# Patient Record
Sex: FEMALE | Race: WHITE | Employment: UNEMPLOYED | URBAN - METROPOLITAN AREA
[De-identification: names, ages, dates, MRNs, and addresses within clinical notes are randomized per-mention and may not be internally consistent; named-entity substitution may affect disease eponyms.]

---

## 2018-03-28 ENCOUNTER — TRANSFERRED RECORDS (OUTPATIENT)
Dept: HEALTH INFORMATION MANAGEMENT | Facility: CLINIC | Age: 23
End: 2018-03-28

## 2018-03-28 LAB
C TRACH DNA SPEC QL PROBE+SIG AMP: NEGATIVE
N GONORRHOEA DNA SPEC QL PROBE+SIG AMP: NEGATIVE
PAP-ABSTRACT: NORMAL
SPECIMEN DESCRIP: NORMAL
SPECIMEN DESCRIPTION: NORMAL

## 2018-04-04 ENCOUNTER — TRANSFERRED RECORDS (OUTPATIENT)
Dept: HEALTH INFORMATION MANAGEMENT | Facility: CLINIC | Age: 23
End: 2018-04-04

## 2018-04-04 LAB — PAP SMEAR - HIM PATIENT REPORTED: NORMAL

## 2018-04-20 ENCOUNTER — TRANSFERRED RECORDS (OUTPATIENT)
Dept: HEALTH INFORMATION MANAGEMENT | Facility: CLINIC | Age: 23
End: 2018-04-20

## 2018-05-22 ENCOUNTER — TRANSFERRED RECORDS (OUTPATIENT)
Dept: HEALTH INFORMATION MANAGEMENT | Facility: CLINIC | Age: 23
End: 2018-05-22

## 2018-06-19 ENCOUNTER — TRANSFERRED RECORDS (OUTPATIENT)
Dept: HEALTH INFORMATION MANAGEMENT | Facility: CLINIC | Age: 23
End: 2018-06-19

## 2018-10-11 ENCOUNTER — OFFICE VISIT (OUTPATIENT)
Dept: FAMILY MEDICINE | Facility: CLINIC | Age: 23
End: 2018-10-11
Payer: OTHER GOVERNMENT

## 2018-10-11 VITALS
SYSTOLIC BLOOD PRESSURE: 126 MMHG | WEIGHT: 168 LBS | HEIGHT: 69 IN | HEART RATE: 73 BPM | BODY MASS INDEX: 24.88 KG/M2 | TEMPERATURE: 98.3 F | DIASTOLIC BLOOD PRESSURE: 86 MMHG

## 2018-10-11 DIAGNOSIS — H53.10 EYE STRAIN, BILATERAL: ICD-10-CM

## 2018-10-11 DIAGNOSIS — R87.613 HSIL (HIGH GRADE SQUAMOUS INTRAEPITHELIAL LESION) ON PAP SMEAR OF CERVIX: Primary | ICD-10-CM

## 2018-10-11 DIAGNOSIS — Z98.890 S/P LEEP: ICD-10-CM

## 2018-10-11 PROCEDURE — 99203 OFFICE O/P NEW LOW 30 MIN: CPT | Performed by: FAMILY MEDICINE

## 2018-10-11 NOTE — PROGRESS NOTES
"  SUBJECTIVE:   Kindra Gresham is a 23 year old female who presents to clinic today for the following health issues:      [place] this [place].   Hx of HSIL on colposcopy, s/p LEEP procedure. Records sent to abstract.   Would like to establish care with Gyn for follow up testing.   No concerns currently.     No other significant health history.     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   Resolve on their own most times, takes ibuprofen occasionally.     Has not had eyes checked every. No trouble with blurry or double vision.   Sleeping well. No significant stressors.  Eating well, drinking water.   Espresso in the AM, nothing following that.   No sinus pressure, ear fullness, nasal congestion.   No dyspnea, palpitation, change in B/B.   No FH of headache syndromes or neurologic conditions.     No medications or supplements.     Health maintenance- robyn given    Problem list and histories reviewed & adjusted, as indicated.  Additional history: none    Patient Active Problem List   Diagnosis     HSIL (high grade squamous intraepithelial lesion) on Pap smear of cervix     S/P LEEP     History reviewed. No pertinent surgical history.    Social History   Substance Use Topics     Smoking status: Never Smoker     Smokeless tobacco: Never Used     Alcohol use Yes      Comment: occ     Family History   Problem Relation Age of Onset     Pulmonary Embolism Father      Myocardial Infarction Maternal Grandfather            Reviewed and updated as needed this visit by clinical staff       Reviewed and updated as needed this visit by Provider         ROS:  Constitutional, HEENT, cardiovascular, pulmonary, gi and gu systems are negative, except as otherwise noted.    OBJECTIVE:     /86 (BP Location: Right arm, Patient Position: Sitting, Cuff Size: Adult Regular)  Pulse 73  Temp 98.3  F (36.8  C) (Oral)  Ht 5' 9\" (1.753 m)  Wt 168 lb (76.2 " kg)  Breastfeeding? No  BMI 24.81 kg/m2  Body mass index is 24.81 kg/(m^2).  GENERAL: healthy, alert and no distress  EYES: Eyes grossly normal to inspection, EOMI, PERRL and conjunctivae and sclerae normal  HENT: ear canals and TM's normal, nose and mouth without ulcers or lesions  NECK: no adenopathy, no asymmetry, masses, or scars and thyroid normal to palpation  RESP: lungs clear to auscultation - no rales, rhonchi or wheezes  CV: regular rate and rhythm, normal S1 S2, no S3 or S4, no murmur, click or rub, no peripheral edema and peripheral pulses strong  MS: no gross musculoskeletal defects noted, no edema  SKIN: no suspicious lesions or rashes  NEURO: Normal strength and tone, mentation intact and speech normal  PSYCH: mentation appears normal, affect normal/bright    Diagnostic Test Results:  none     ASSESSMENT/PLAN:     1. HSIL (high grade squamous intraepithelial lesion) on Pap smear of cervix  - OB/GYN REFERRAL    2. S/P LEEP  - OB/GYN REFERRAL    3. Eye strain, bilateral - discussed likely cause of her headaches with studying, advised frequent breaks from her studies and eye exam in near future.     Drea Chau MD  [facility]

## 2018-10-11 NOTE — Clinical Note
Please abstract the following data from this visit with this patient into the appropriate field in Epic:  Pap smear done on this date:  (approximately), by this group: [facility], results were 4/4/18.

## 2018-10-11 NOTE — MR AVS SNAPSHOT
After Visit Summary   10/11/2018    Kindra Gresham    MRN: [id number]           Patient Information     Date Of Birth          1995        Visit Information        Provider Department      10/11/2018 2:00 PM Drea Chau MD [facility]        Today's Diagnoses     HSIL (high grade squamous intraepithelial lesion) on Pap smear of cervix        S/P LEEP           Follow-ups after your visit        Additional Services     OB/GYN REFERRAL       Your provider has referred you to:  FMG: [facility] [phone]   [email or web address]    Please be aware that coverage of these services is subject to the terms and limitations of your health insurance plan.  Call member services at your health plan with any benefit or coverage questions.      Please bring the following with you to your appointment:    (1) Any X-Rays, CTs or MRIs which have been performed.  Contact the facility where they were done to arrange for  prior to your scheduled appointment.   (2) List of current medications   (3) This referral request   (4) Any documents/labs given to you for this referral                  Who to contact     If you have questions or need follow up information about today's clinic visit or your schedule please contact [facility] directly at [phone].  Normal or non-critical lab and imaging results will be communicated to you by MyChart, letter or phone within 4 business days after the clinic has received the results. If you do not hear from us within 7 days, please contact the clinic through MyChart or phone. If you have a critical or abnormal lab result, we will notify you by phone as soon as possible.  Submit refill requests through [employer] or call your pharmacy and they will forward the refill request to us. Please allow 3 business days for your refill to be completed.          Additional Information  "About Your Visit        Market[employer]voice[employer] Information     Collective lets you send messages to your doctor, view your test results, renew your prescriptions, schedule appointments and more. To sign up, go to [email or web address] . Click on \"Log in\" on the left side of the screen, which will take you to the Welcome page. Then click on \"Sign up Now\" on the right side of the page.     You will be asked to enter the access code listed below, as well as some personal information. Please follow the directions to create your username and password.     Your access code is: [email or web address]  Expires: 2019  1:32 PM     Your access code will  in 90 days. If you need help or a new code, please call your [facility] clinic or [phone].        Care EveryWhere ID     This is your Care EveryWhere ID. This could be used by other organizations to access your [facility] medical records  [id number]        Your Vitals Were     Pulse Temperature Height Breastfeeding? BMI (Body Mass Index)       73 98.3  F (36.8  C) (Oral) 5' 9\" (1.753 m) No 24.81 kg/m2        Blood Pressure from Last 3 Encounters:   10/11/18 126/86    Weight from Last 3 Encounters:   10/11/18 168 lb (76.2 kg)              We Performed the Following     OB/GYN REFERRAL        Primary Care Provider Office Phone # Fax #    Drea Lv Chau [phone] [address] [id number]        Equal Access to Services     [facility]: Hadii jhony ku hadasho Soomaali, waaxda luqadaha, qaybta kaalmada adeegyada, patty myers . So [facility] [phone].    ATENCIÓN: Si habla español, tiene a walker disposición servicios gratuitos de asistencia lingüística. Llame al [phone].    We comply with applicable federal civil rights laws and [place] laws. We do not discriminate on the basis of race, color, national origin, age, disability, sex, sexual orientation, or gender identity.            Thank you!     Thank you for choosing " [facility]  for your care. Our goal is always to provide you with excellent care. Hearing back from our patients is one way we can continue to improve our services. Please take a few minutes to complete the written survey that you may receive in the mail after your visit with us. Thank you!             Your Updated Medication List - Protect others around you: Learn how to safely use, store and throw away your medicines at www.disposemymeds.org.      Notice  As of 10/11/2018  2:18 PM    You have not been prescribed any medications.

## 2018-12-03 ENCOUNTER — OFFICE VISIT (OUTPATIENT)
Dept: OBGYN | Facility: CLINIC | Age: 23
End: 2018-12-03
Attending: FAMILY MEDICINE
Payer: OTHER GOVERNMENT

## 2018-12-03 ENCOUNTER — RESULT FOLLOW UP (OUTPATIENT)
Dept: OBGYN | Facility: CLINIC | Age: 23
End: 2018-12-03

## 2018-12-03 VITALS
DIASTOLIC BLOOD PRESSURE: 78 MMHG | SYSTOLIC BLOOD PRESSURE: 124 MMHG | WEIGHT: 168.5 LBS | HEIGHT: 69 IN | BODY MASS INDEX: 24.96 KG/M2

## 2018-12-03 DIAGNOSIS — R10.2 PELVIC CRAMPING: ICD-10-CM

## 2018-12-03 DIAGNOSIS — R87.613 HSIL (HIGH GRADE SQUAMOUS INTRAEPITHELIAL LESION) ON PAP SMEAR OF CERVIX: ICD-10-CM

## 2018-12-03 DIAGNOSIS — Z98.890 S/P LEEP: ICD-10-CM

## 2018-12-03 DIAGNOSIS — N92.0 MENORRHAGIA WITH REGULAR CYCLE: Primary | ICD-10-CM

## 2018-12-03 PROCEDURE — 88175 CYTOPATH C/V AUTO FLUID REDO: CPT | Performed by: FAMILY MEDICINE

## 2018-12-03 PROCEDURE — 99204 OFFICE O/P NEW MOD 45 MIN: CPT | Performed by: FAMILY MEDICINE

## 2018-12-03 PROCEDURE — G0476 HPV COMBO ASSAY CA SCREEN: HCPCS | Performed by: FAMILY MEDICINE

## 2018-12-03 NOTE — MR AVS SNAPSHOT
After Visit Summary   12/3/2018    Kindra Gresham    MRN: [id number]           Patient Information     Date Of Birth          1995        Visit Information        Provider Department      12/3/2018 1:30 PM Stephanie Alicia,  [facility]        Today's Diagnoses     Menorrhagia with regular cycle    -  1    Pelvic cramping          Care Instructions    Medical treatment Endo:   Oral Contraceptive   IUD   Nexplanon   Depo-provera   Lupron  femara   Orilissa     Surgical   Robotic assisted laparoscopic endometriosis resection (removal)     TODAY WE ARE STARTING WITH PELVIC ultrasound   Return in 3 months   Mychart me with questions or make appointment   See you in 3 months   Will notify you of the ultrasound results       Dr. Stephanie Alicia, DO    Obstetrics and Gynecology  [facility] and [place]                   Follow-ups after your visit        Future tests that were ordered for you today     Open Future Orders        Priority Expected Expires Ordered    US Pelvic Complete with Transvaginal Routine 12/3/2018 6/1/2019 12/3/2018            Who to contact     If you have questions or need follow up information about today's clinic visit or your schedule please contact [facility] directly at [phone].  Normal or non-critical lab and imaging results will be communicated to you by MyChart, letter or phone within 4 business days after the clinic has received the results. If you do not hear from us within 7 days, please contact the clinic through SportsBUZZhart or phone. If you have a critical or abnormal lab result, we will notify you by phone as soon as possible.  Submit refill requests through Super[employer] or call your pharmacy and they will forward the refill request to us. Please allow 3 business days for your refill to be completed.          Additional Information About Your Visit        MyChart Information     Super[employer]ension gives you secure  "access to your electronic health record. If you see a primary care provider, you can also send messages to your care team and make appointments. If you have questions, please call your primary care clinic.  If you do not have a primary care provider, please call [phone] and they will assist you.        Care EveryWhere ID     This is your Care EveryWhere ID. This could be used by other organizations to access your [facility] medical records  [id number]        Your Vitals Were     Height Last Period BMI (Body Mass Index)             5' 9\" (1.753 m) 11/16/2018 24.88 kg/m2          Blood Pressure from Last 3 Encounters:   12/03/18 124/78   10/11/18 126/86    Weight from Last 3 Encounters:   12/03/18 168 lb 8 oz (76.4 kg)   10/11/18 168 lb (76.2 kg)               Primary Care Provider Office Phone # Fax #    Drea Lv Chau [phone] [address] [facility] [address] [address]        Equal Access to Services     [facility]: Hadii aad ku hadasho Soomaali, waaxda luqadaha, qaybta kaalmada adeegyada, waxay idiin hayhamida myers . So [facility] [phone].    ATENCIÓN: Si habla español, tiene a walker disposición servicios gratuitos de asistencia lingüística. Llame al [phone].    We comply with applicable federal civil rights laws and [place] laws. We do not discriminate on the basis of race, color, national origin, age, disability, sex, sexual orientation, or gender identity.            Thank you!     Thank you for choosing [facility]  for your care. Our goal is always to provide you with excellent care. Hearing back from our patients is one way we can continue to improve our services. Please take a few minutes to complete the written survey that you may receive in the mail after your visit with us. Thank you!             Your Updated Medication List - Protect others around you: Learn how to safely use, store and throw away your medicines at www.disposemymeds.org. "      Notice  As of 12/3/2018  1:47 PM    You have not been prescribed any medications.

## 2018-12-03 NOTE — LETTER
January 8, 2020      Kindra Gresham  [address] [place] [id number]    Dear MsSiminMp,      At [facility], your health and wellness is our primary concern. That is why we are following up on a colposcopy from 01/22/19. Your provider had recommended that you have a Pap smear and HPV test completed by 01/22/20. Our records do not show that this has been scheduled.    It is important to complete the follow up that your provider has suggested for you to ensure that there are no worsening changes which may, over time, develop into cancer.      Please contact our office at  [phone] to schedule an appointment for a Pap smear and HPV test at your earliest convenience. If you have questions or concerns, please call the clinic and we will be happy to assist you.    If you have completed the tests outside of [facility], please have the results forwarded to our office. We will update the chart for your primary Physician to review before your next annual physical.     Thank you for choosing [facility]!    Sincerely,      Your [facility] Care Team //[facility]   Fall with Harm Risk

## 2018-12-03 NOTE — PROGRESS NOTES
SUBJECTIVE:  Kindra Gresham is an 23 year old  woman who presents for   gynecology consult for menorrhagia, dysmenorrhea & repeat pap for HSIL/CINDY I.  Patient's last menstrual period was 2018. Periods are regular q 28-30 days, lasting 5 days. Dysmenorrhea: moderate to severe, occurring throughout the year but worse during menses. Cyclic symptoms include pelvic pain/cramping. No intermenstrual bleeding, spotting, or discharge.    Current contraception: none - since 2018, trying for pregnancy  History of abnormal Pap smear: Yes - HSIL, LEEP in 2018  Family history of uterine or ovarian cancer: No  History of abnormal mammogram: No  Family history of breast cancer: No    Concerns today:     - Pt previously discussed symptoms of menorrhagia & dysmenorrhea with [place] provider in [place], was mentioned that she might have endometriosis but no treatment was explored. She experiences heavy menses flow for the first few days, with constant cramping but worse during her menses. Pt is considering pregnancy in the near future, so would like to stay away from contraception for treatment if possible.       History reviewed. No pertinent past medical history.       Family History   Problem Relation Age of Onset     Pulmonary Embolism Father      Myocardial Infarction Maternal Grandfather        History reviewed. No pertinent surgical history.    No current outpatient prescriptions on file.     No current facility-administered medications for this visit.      Not on File    Social History   Substance Use Topics     Smoking status: Never Smoker     Smokeless tobacco: Never Used     Alcohol use Yes      Comment: occ       Review Of Systems  Ears/Nose/Throat: negative  Respiratory: No shortness of breath, dyspnea on exertion, cough, or hemoptysis  Cardiovascular: negative  Gastrointestinal: negative  Genitourinary: negative  Constitutional, HEENT, cardiovascular, pulmonary, GI, , musculoskeletal, neuro,  "skin, endocrine and psych systems are negative, except as otherwise noted.      This document serves as a record of the services and decisions personally performed and made by Stephanie Alicia DO. It was created on her behalf by Yajaira Sarmiento, a trained medical scribe. The creation of this document is based the provider's statements to the medical scribe.  Scribe Yajaira Sarmiento 1:35 PM, December 3, 2018    OBJECTIVE:  /78  Ht 1.753 m (5' 9\")  Wt 76.4 kg (168 lb 8 oz)  LMP 2018  BMI 24.88 kg/m2  General appearance: healthy, alert and no distress  Skin: Skin color, texture, turgor normal. No rashes or lesions.  Lungs: negative, Percussion normal. Good diaphragmatic excursion. Lungs clear  Heart: negative, PMI normal. No lifts, heaves, or thrills. RRR. No murmurs, clicks gallops or rub  Abdomen: Abdomen soft, non-tender. BS normal. No masses, organomegaly  Pelvic: Pelvic examination with pap/ Gonorrhea and Chlamydia   including  External genitalia normal   and vagina normal rugatted not atrophic  Examination of urethra normal no masses, tenderness, scarring  bladder, no masses or tenderness  Cervix no lesions or discharge  Bimanual exam with   Uterus 6 weeks size, mid position, mobile, no tenderness, no descent   Adnexa/parametria normal        LABS:  None        ASSESSMENT:  Kindra Gresham is an 23 year old  woman who presents for   gynecology consult for menorrhagia, dysmenorrhea & repeat pap for HSIL/CINDY I.      PLAN:  Dx: Endometriosis; S/P Leep  1)  US ordered; Discussed treatment options   - Hormonal: OCP, IUD, Nexplanon, Depo provera injection    > Lupron, Femara   - Medical: Orilissa, Lyrica, Flexeril   - Surgical: Laparoscopic endometrial resection  Pt would like to proceed with pelvic US at this time & consider information given on above options -- will report through [facility] if she decides to begin treatment before NOV  2)  S/P LEEP: Repeat pap smear - pathology pending  3)  " Return to clinic in 3 months for follow-up.      Rx:  None      The information in this document, created by the medical scribe for me, accurately reflects the services I personally performed and the decisions made by me. I have reviewed and approved this document for accuracy prior to leaving the patient care area.  1:35 PM, 12/03/18    Dr. Stephanie Alicia, DO    Obstetrics and Gynecology  [facility] and [place]

## 2018-12-03 NOTE — NURSING NOTE
"Chief Complaint   Patient presents with     Establish Care     possible endometrosis--2-3 weeks with cramps--heavy periods in the beginning     Repeat Pap Smear     6 month repeat pap       Initial /78  Ht 5' 9\" (1.753 m)  Wt 168 lb 8 oz (76.4 kg)  LMP 11/16/2018  BMI 24.88 kg/m2 Estimated body mass index is 24.88 kg/(m^2) as calculated from the following:    Height as of this encounter: 5' 9\" (1.753 m).    Weight as of this encounter: 168 lb 8 oz (76.4 kg).  BP completed using cuff size: regular    Questioned patient about current smoking habits.  Pt. has never smoked.      No obstetric history on file.    The following HM Due: pap smear             "

## 2018-12-03 NOTE — PATIENT INSTRUCTIONS
Medical treatment Endo:   Oral Contraceptive   IUD   Nexplanon   Depo-provera   Lupron  femara   Orilissa     Surgical   Robotic assisted laparoscopic endometriosis resection (removal)     TODAY WE ARE STARTING WITH PELVIC ultrasound   Return in 3 months   Mychart me with questions or make appointment   See you in 3 months   Will notify you of the ultrasound results       Dr. Stephanie Alicia, DO    Obstetrics and Gynecology  [facility] [place] and [place]

## 2018-12-07 LAB
COPATH REPORT: NORMAL
PAP: NORMAL

## 2018-12-10 ENCOUNTER — ANCILLARY PROCEDURE (OUTPATIENT)
Dept: ULTRASOUND IMAGING | Facility: CLINIC | Age: 23
End: 2018-12-10
Attending: FAMILY MEDICINE
Payer: OTHER GOVERNMENT

## 2018-12-10 DIAGNOSIS — N92.0 MENORRHAGIA WITH REGULAR CYCLE: ICD-10-CM

## 2018-12-10 DIAGNOSIS — R10.2 PELVIC CRAMPING: ICD-10-CM

## 2018-12-10 LAB
FINAL DIAGNOSIS: ABNORMAL
HPV HR 12 DNA CVX QL NAA+PROBE: POSITIVE
HPV16 DNA SPEC QL NAA+PROBE: NEGATIVE
HPV18 DNA SPEC QL NAA+PROBE: NEGATIVE
SPECIMEN DESCRIPTION: ABNORMAL
SPECIMEN SOURCE CVX/VAG CYTO: ABNORMAL

## 2018-12-10 PROCEDURE — 76856 US EXAM PELVIC COMPLETE: CPT | Performed by: OBSTETRICS & GYNECOLOGY

## 2018-12-10 PROCEDURE — 76830 TRANSVAGINAL US NON-OB: CPT | Performed by: OBSTETRICS & GYNECOLOGY

## 2018-12-11 ENCOUNTER — TELEPHONE (OUTPATIENT)
Dept: OBGYN | Facility: CLINIC | Age: 23
End: 2018-12-11

## 2018-12-11 NOTE — TELEPHONE ENCOUNTER
Patient called today to see if she can get her pap results. I did explain to patient results are in but hasn't been finalized yet. Patient would like to know if someone can call her with the final results today.  Grace Torres

## 2018-12-11 NOTE — TELEPHONE ENCOUNTER
Spoke to pt, gave result.  She will cb to schedule colp.  Shelly IQBAL R.N.  [facility]

## 2018-12-11 NOTE — PROGRESS NOTES
03/28/18: ASC-H and LSIL pap (abstracted records)  03/28/18: [place] HSIL,CINDY 2 with LSIL also present ECC Neg for dysplasia (abstracted records)  04/20/18: LEEP CINDY 2 with LSIL also present, margins clear. (abstracted records)  12/03/18: NIL pap, + HR HPV (not 16 or 18) result. Plan [place] per provider due bef 03/03/19.  12/12/18: Pt was advised.  01/22/19: [place] Neg for dysplasia. Plan cotest in 1 year.    01/08/20 [employer] cotest reminder message sent. ([facility])  02/06/20 [facility] clinic and schedule. ([facility])  03/04/20 Patient is lost to pap tracking follow-up. FYI routed to provider. ([facility])

## 2018-12-11 NOTE — TELEPHONE ENCOUNTER
The pap is normal and HPV positive, recommend colp   And pap nurses will call with more details.   Dr. Stephanie Alicia, DO    Obstetrics and Gynecology  [facility]

## 2018-12-17 ENCOUNTER — OFFICE VISIT (OUTPATIENT)
Dept: OBGYN | Facility: CLINIC | Age: 23
End: 2018-12-17
Payer: OTHER GOVERNMENT

## 2018-12-17 VITALS
DIASTOLIC BLOOD PRESSURE: 82 MMHG | SYSTOLIC BLOOD PRESSURE: 126 MMHG | BODY MASS INDEX: 25.13 KG/M2 | WEIGHT: 169.7 LBS | HEIGHT: 69 IN

## 2018-12-17 DIAGNOSIS — E28.2 PCOS (POLYCYSTIC OVARIAN SYNDROME): Primary | ICD-10-CM

## 2018-12-17 DIAGNOSIS — N94.6 DYSMENORRHEA: ICD-10-CM

## 2018-12-17 DIAGNOSIS — Q51.28 SEPTATE UTERUS: ICD-10-CM

## 2018-12-17 PROCEDURE — 36415 COLL VENOUS BLD VENIPUNCTURE: CPT | Performed by: FAMILY MEDICINE

## 2018-12-17 PROCEDURE — 99213 OFFICE O/P EST LOW 20 MIN: CPT | Performed by: FAMILY MEDICINE

## 2018-12-17 PROCEDURE — 84270 ASSAY OF SEX HORMONE GLOBUL: CPT | Performed by: FAMILY MEDICINE

## 2018-12-17 PROCEDURE — 84144 ASSAY OF PROGESTERONE: CPT | Performed by: FAMILY MEDICINE

## 2018-12-17 PROCEDURE — 83002 ASSAY OF GONADOTROPIN (LH): CPT | Performed by: FAMILY MEDICINE

## 2018-12-17 PROCEDURE — 82670 ASSAY OF TOTAL ESTRADIOL: CPT | Performed by: FAMILY MEDICINE

## 2018-12-17 PROCEDURE — 84403 ASSAY OF TOTAL TESTOSTERONE: CPT | Performed by: FAMILY MEDICINE

## 2018-12-17 PROCEDURE — 99000 SPECIMEN HANDLING OFFICE-LAB: CPT | Performed by: FAMILY MEDICINE

## 2018-12-17 PROCEDURE — 82157 ASSAY OF ANDROSTENEDIONE: CPT | Mod: 90 | Performed by: FAMILY MEDICINE

## 2018-12-17 PROCEDURE — 83001 ASSAY OF GONADOTROPIN (FSH): CPT | Performed by: FAMILY MEDICINE

## 2018-12-17 PROCEDURE — 84443 ASSAY THYROID STIM HORMONE: CPT | Performed by: FAMILY MEDICINE

## 2018-12-17 PROCEDURE — 82627 DEHYDROEPIANDROSTERONE: CPT | Performed by: FAMILY MEDICINE

## 2018-12-17 PROCEDURE — 84146 ASSAY OF PROLACTIN: CPT | Performed by: FAMILY MEDICINE

## 2018-12-17 RX ORDER — CYCLOBENZAPRINE HCL 5 MG
TABLET ORAL
Qty: 30 TABLET | Refills: 1 | Status: SHIPPED | OUTPATIENT
Start: 2018-12-17 | End: 2019-07-03

## 2018-12-17 RX ORDER — DICLOFENAC POTASSIUM 50 MG/1
50 TABLET, FILM COATED ORAL 3 TIMES DAILY
Qty: 270 TABLET | Refills: 3 | Status: SHIPPED | OUTPATIENT
Start: 2018-12-17 | End: 2019-07-03

## 2018-12-17 ASSESSMENT — MIFFLIN-ST. JEOR: SCORE: 1594.13

## 2018-12-17 NOTE — PROGRESS NOTES
"Kindra Gresham is a 23 year old  who presents for colposcopy for NIL/+ HR HPV [not 16/18].      She has abnormal prior history:  18: ASC-H and LSIL pap (abstracted records)  18: [place] HSIL,CINDY 2 with LSIL also present ECC Neg for dysplasia (abstracted records)  18: LEEP CINDY 2 with LSIL also present, margins clear. (abstracted records)  18: NIL pap, + HR HPV (not 16 or 18) result. Plan [place] per provider.        GYNECOLOGIC HISTORY   Menarche: ***  Contraception Use: ***  Pap: NIL/+ HR HPV [not 16/18]          ***  Risks and benefits were explained in detail prior to procedure including bleeding, infection and possible need for further treatment.  Informed consent was obtained to proceed.    Procedure/Findings:  Patient was placed in dorsal lithotomy position. Speculum was inserted. Gross examination of the cervix revealed ***.  Acetic acid and Lugol's solution applied.    1. Colposcopy adequate:  {YES / NO:292098::\"Yes\"}     2. Squamocolumnar junction visibility: completely visible    3. Normal colposcopic findings:      Original squamous epithelium:  {NORMAL:470789}    Other: ***    4. Abnormal colposcopic findings:  {yes no:217652}    Location of lesion:  {IN/OUT:268290} transformation zone at *** o'clock    Size of lesion:  *** quadrants,  ***% of cervix    Grade 1 (minor): {yes no:246267}     Details: {FINDINGS; SKIN NORMAL EXAM:755194}    Grade 2 (major): {yes no:317038}     Details: ***    Non-specific:      Leukoplakia: {yes no:105902}     Erosion: {yes no:261094}    Lugol's staining: ***stained    Suspicious for invasion: {yes no:132236}     Details: {FINDINGS; SKIN NORMAL EXAM:023931}    Miscellaneous findings: ***    5.  *** biopsies were obtained at *** o'clock.     ECC was *** performed.     Hemostasis was achieved with ***.        Procedure course:   Patient tolerated procedure well  Assessment:   Normal exam without visible pathology, CINDY 1, CINDY 2 and CINDY " 3  Plan:     1. - if no dysplasia, repeat pap in 6/12 months       - if CIN1, recommend repeat pap in 6/12 months or HPV in 1 year       - if CIN2-3 recommend LEEP, Cryo or Repeat pap/colposcopy in 6/12 months  2. ***          ***  Dr. Stephanie Alicia, DO    OB/GYN   [facility]      2011 Colposcopic Terminology of the International Federation for Cervical Pathology and Colposcopy  Obstetrics and Gynecology, VOL. 120, NO. 1, JULY 2012

## 2018-12-17 NOTE — NURSING NOTE
"Chief Complaint   Patient presents with     Results     Pelvic US results--PCOS--Paternal Grandmother had endometriosis       Initial /82 (BP Location: Right arm, Patient Position: Sitting, Cuff Size: Adult Regular)   Ht 1.753 m (5' 9\")   Wt 77 kg (169 lb 11.2 oz)   BMI 25.06 kg/m   Estimated body mass index is 25.06 kg/m  as calculated from the following:    Height as of this encounter: 1.753 m (5' 9\").    Weight as of this encounter: 77 kg (169 lb 11.2 oz).  BP completed using cuff size: regular    Questioned patient about current smoking habits.  Pt. has never smoked.          The following HM Due: NONE      "

## 2018-12-17 NOTE — PROGRESS NOTES
"SUBJECTIVE:  Kindra Gresham is an 23 year old  woman who presents for   Gyn follow-up exam. She was seen on 2018, for menorrhagia & dysmenorrhea.   Last time her treatment was US to check for endometriosis or PCOS.      US 12/10/2018 Results:  \"Complete pelvic ultrasound using realtime   transabdominal and transvaginal scanning    Sonographic findings indicative of septate uterus       -consider hydrosonography or MRI    Note: sonographic findings suggestive of PCOS\"      Today in Clinic:  - Pt reports period is regular q every 28-30 days, lasting 6-7 days with intermittent episodes of heavy bleeding & cramping [2nd/3rd days are the worst]. She would like to avoid contraception as treatment for her symptoms, as she is planning to try for conception in the near future.     Past Medical History:   Diagnosis Date     Abnormal Pap smear of cervix 2018: See problem list.           Family History   Problem Relation Age of Onset     Pulmonary Embolism Father      Myocardial Infarction Maternal Grandfather      Endometriosis Paternal Grandmother        Past Surgical History:   Procedure Laterality Date     LEEP TX, CERVICAL  2018: LEEP CINDY 2 with LSIL also present, margins clear. (abstracted records)       Current Outpatient Medications   Medication     cyclobenzaprine (FLEXERIL) 5 MG tablet     diclofenac (CATAFLAM) 50 MG tablet     Prenatal Vit-Fe Fumarate-FA (PRENATAL VITAMIN PO)     No current facility-administered medications for this visit.      Not on File    Social History     Tobacco Use     Smoking status: Never Smoker     Smokeless tobacco: Never Used   Substance Use Topics     Alcohol use: Yes     Comment: occ       Review Of Systems  Ears/Nose/Throat: negative  Respiratory: No shortness of breath, dyspnea on exertion, cough, or hemoptysis  Cardiovascular: negative  Gastrointestinal: negative  Genitourinary: negative  Constitutional, HEENT, " "cardiovascular, pulmonary, GI, , musculoskeletal, neuro, skin, endocrine and psych systems are negative, except as otherwise noted.      This document serves as a record of the services and decisions personally performed and made by Stephanie Alicia DO. It was created on her behalf by Yajaira Sarmiento, a trained medical scribe. The creation of this document is based the provider's statements to the medical scribe.  Scribe Yajaira Sarmiento 3:00 PM, 2018    OBJECTIVE:  /82 (BP Location: Right arm, Patient Position: Sitting, Cuff Size: Adult Regular)   Ht 1.753 m (5' 9\")   Wt 77 kg (169 lb 11.2 oz)   BMI 25.06 kg/m    General appearance: healthy, alert and no distress  Skin: Skin color, texture, turgor normal. No rashes or lesions.    ASSESSMENT:  Kindra Gresham is an 23 year old  woman who presents for   Gyn follow-up exam. She was seen on 2018, for menorrhagia & dysmenorrhea.     PLAN:  Dx: PCOS; Septate uterus; Dysmenorrhea  1)  PCOS on ultrasound: Labs pending; Found on US & informed it is a mild case, discussed next steps   - Metformin as treatment if difficulty with symptoms   - Clomid/Femara to stimulate ovulation & aid conception if no pregnancy in 6-8 months  Pt would like to wait on any treatment at this time & report when/if she desires any options  2)  Septate uterus: Possible septum, discussed ways to work-up the patient.    - MRI   - Laparoscopy with hysteroscopy  Pt would like to proceed with an MRI [order placed] to confirm the shape of her uterus  3)  Dysmenorrhea: Discussed Cataflam & Flexeril to alleviate symptoms, Pt would like to proceed with both -- instructions given on when to take   - Will wait for surgical treatment, would like to give medication a try for a few  months & then if there is no improvement she will consider scheduling a procedure   - May still take Tylenol as well for pain management  4)  Return to clinic in 3-6 months for follow-up.     15 " minutes spent with the patient with greater than 50% of the time spent in counseling the patient.       Rx:  - Flexeril 5 mg 1 tab po at night as needed  - Cataflam 50 mg 1 tab po TID        The information in this document, created by the medical scribe for me, accurately reflects the services I personally performed and the decisions made by me. I have reviewed and approved this document for accuracy prior to leaving the patient care area.  3:00 PM, 12/17/18    Dr. Stephanie Alicia, DO    OB/GYN   [facility]

## 2018-12-17 NOTE — PATIENT INSTRUCTIONS
Labs today     [phone]     Pain control for period, cataflam and flexeril and tylenol       Dr. Stephanie Alicia, DO    Obstetrics and Gynecology  [facility] and [place]

## 2018-12-18 LAB
DHEA-S SERPL-MCNC: 449 UG/DL (ref 35–430)
ESTRADIOL SERPL-MCNC: 32 PG/ML
FSH SERPL-ACNC: 7.3 IU/L
LH SERPL-ACNC: 7.4 IU/L
PROGEST SERPL-MCNC: 1.1 NG/ML
PROLACTIN SERPL-MCNC: 12 UG/L (ref 3–27)
TSH SERPL DL<=0.005 MIU/L-ACNC: 1.49 MU/L (ref 0.4–4)

## 2018-12-19 LAB
SHBG SERPL-SCNC: 48 NMOL/L (ref 30–135)
TESTOST FREE SERPL-MCNC: 0.34 NG/DL (ref 0.08–0.74)
TESTOST SERPL-MCNC: 25 NG/DL (ref 8–60)

## 2018-12-19 RX ORDER — METFORMIN HCL 500 MG
500 TABLET, EXTENDED RELEASE 24 HR ORAL
Qty: 90 TABLET | Refills: 3 | Status: SHIPPED | OUTPATIENT
Start: 2018-12-19 | End: 2019-02-26

## 2018-12-20 LAB — ANDROST SERPL-MCNC: 1.12 NG/ML (ref 0.26–2.14)

## 2018-12-26 ENCOUNTER — HOSPITAL ENCOUNTER (OUTPATIENT)
Dept: MRI IMAGING | Facility: CLINIC | Age: 23
Discharge: HOME | End: 2018-12-26
Attending: FAMILY MEDICINE | Admitting: FAMILY MEDICINE
Payer: OTHER GOVERNMENT

## 2018-12-26 DIAGNOSIS — Q51.28 SEPTATE UTERUS: ICD-10-CM

## 2018-12-26 PROCEDURE — 72197 MRI PELVIS W/O & W/DYE: CPT

## 2018-12-26 PROCEDURE — 25500064 ZZH RX 255 OP 636: Performed by: FAMILY MEDICINE

## 2018-12-26 PROCEDURE — A9585 GADOBUTROL INJECTION: HCPCS | Performed by: FAMILY MEDICINE

## 2018-12-26 RX ORDER — GADOBUTROL 604.72 MG/ML
7.5 INJECTION INTRAVENOUS ONCE
Status: COMPLETED | OUTPATIENT
Start: 2018-12-26 | End: 2018-12-26

## 2018-12-26 RX ADMIN — GADOBUTROL 7.5 ML: 604.72 INJECTION INTRAVENOUS at 12:03

## 2019-01-11 PROBLEM — Z98.890 S/P LEEP: Status: ACTIVE | Noted: 2018-10-11

## 2019-01-22 ENCOUNTER — TELEPHONE (OUTPATIENT)
Dept: OBGYN | Facility: CLINIC | Age: 24
End: 2019-01-22

## 2019-01-22 ENCOUNTER — OFFICE VISIT (OUTPATIENT)
Dept: OBGYN | Facility: CLINIC | Age: 24
End: 2019-01-22
Payer: OTHER GOVERNMENT

## 2019-01-22 VITALS
BODY MASS INDEX: 25.9 KG/M2 | SYSTOLIC BLOOD PRESSURE: 154 MMHG | WEIGHT: 174.9 LBS | HEIGHT: 69 IN | DIASTOLIC BLOOD PRESSURE: 90 MMHG

## 2019-01-22 DIAGNOSIS — R10.2 PELVIC PAIN IN FEMALE: ICD-10-CM

## 2019-01-22 DIAGNOSIS — Q51.28 SEPTATE UTERUS: ICD-10-CM

## 2019-01-22 DIAGNOSIS — N92.0 MENORRHAGIA WITH REGULAR CYCLE: ICD-10-CM

## 2019-01-22 DIAGNOSIS — B97.7 HPV IN FEMALE: Primary | ICD-10-CM

## 2019-01-22 DIAGNOSIS — Z98.890 S/P LEEP: ICD-10-CM

## 2019-01-22 LAB — HCG, QUAL URINE: NEGATIVE

## 2019-01-22 PROCEDURE — 84703 CHORIONIC GONADOTROPIN ASSAY: CPT | Performed by: FAMILY MEDICINE

## 2019-01-22 PROCEDURE — 88305 TISSUE EXAM BY PATHOLOGIST: CPT | Performed by: FAMILY MEDICINE

## 2019-01-22 PROCEDURE — 57454 BX/CURETT OF CERVIX W/SCOPE: CPT | Performed by: FAMILY MEDICINE

## 2019-01-22 PROCEDURE — 99213 OFFICE O/P EST LOW 20 MIN: CPT | Mod: 25 | Performed by: FAMILY MEDICINE

## 2019-01-22 ASSESSMENT — MIFFLIN-ST. JEOR: SCORE: 1612.72

## 2019-01-22 NOTE — PROGRESS NOTES
Kindra Gresham is a 24 year old  who presents for colposcopy for NIL/+ HR HPV [not 16/18].        She has abnormal prior history:  18: ASC-H and LSIL pap (abstracted records)  18: [place] HSIL,CINDY 2 with LSIL also present ECC Neg for dysplasia (abstracted records)  18: LEEP CINDY 2 with LSIL also present, margins clear. (abstracted records)  18: NIL pap, + HR HPV (not 16 or 18) result. Plan [place] per provider.   19: [facility] scheduled.          GYNECOLOGIC HISTORY   Contraception Use: None  Pap: NIL/+ HR HPV [not 16/18]      - Pt reports she is currently trying for pregnancy, did have unprotected sexual intercourse within the last 2 weeks. The UPT today was negative, Pt is willing to proceed with this procedure.    > Have been trying since September -- reports dx of PCOS, endometriosis &  small septate uterus    >> Considering surgical treatment of endometriosis & uterine septate        This document serves as a record of the services and decisions personally performed and made by Stephanie Alicia DO. It was created on her behalf by Yaajira Sarmiento, a trained medical scribe. The creation of this document is based the provider's statements to the medical scribe.  Scribe Yajaira Sarmiento 8:21 AM, 2019    Risks and benefits were explained in detail prior to procedure including bleeding, infection and possible need for further treatment.  Informed consent was obtained to proceed.    Procedure/Findings:  Patient was placed in dorsal lithotomy position. Speculum was inserted. Gross examination of the cervix revealed mild acetic changes.  Acetic acid and Lugol's solution applied.    1. Colposcopy adequate:  Yes     2. Squamocolumnar junction visibility: completely visible    3. Normal colposcopic findings:      Original squamous epithelium:  mature    Other: None    4. Abnormal colposcopic findings:  yes    Location of lesion:  inside transformation zone at 12, 4 & 8 o'clock    Size  of lesion:  3 quadrants,  6% of cervix    Grade 1 (minor): yes     Details: fine mosaicism, fine punctation and thin acetowhite    epithelium    Grade 2 (major): no     Details: None    Non-specific:      Leukoplakia: no     Erosion: no    Lugol's staining: not stained    Suspicious for invasion: no     Details: None    Miscellaneous findings: None    5.  3 biopsies were obtained at 12, 4 & 8 o'clock.     ECC was performed.     Hemostasis was achieved with Monsel's.        Procedure course:   Patient tolerated procedure well  Assessment:   Normal exam without visible pathology, CINDY 1, CINDY 2 and CINDY 3  Plan:     1. - if no dysplasia, repeat pap in 6/12 months       - if CIN1, recommend repeat pap in 6/12 months or HPV in 1 year       - if CIN2-3 recommend LEEP, Cryo or Repeat pap/colposcopy in 6/12 months  2. Results will be reported within 1 week, advised to abstain from sexual intercourse during this time to allow healing.   3. Discussed uterine septate removal & endometriosis resection -- full procedure & recovery explained for both   - Informed this can improve the viability of pregnancies, with septum the risk of SAB is 21-40% and the risk of  birth can be up to 30%.    - Pt would like to proceed with both surgeries, specifically at the same time if  possible -- to be scheduled at next available opening, advised to prevent pregnancy with condoms during this time or abstain   Prior to the surgery. If pregnancy occurs will follow closely with ultrasound to reduce risks to the pregnancy  4. Return to clinic as needed.         The information in this document, created by the medical scribe for me, accurately reflects the services I personally performed and the decisions made by me. I have reviewed and approved this document for accuracy prior to leaving the patient care area.  8:21 AM, 19    Dr. Stephanie Alicia, DO    OB/GYN   [facility]       Colposcopic  Terminology of the International Federation for Cervical Pathology and Colposcopy  Obstetrics and Gynecology, VOL. 120, NO. 1, JULY 2012

## 2019-01-22 NOTE — NURSING NOTE
"Chief Complaint   Patient presents with     Colposcopy       Initial /90 (BP Location: Right arm, Patient Position: Sitting, Cuff Size: Adult Regular)   Ht 1.753 m (5' 9\")   Wt 79.3 kg (174 lb 14.4 oz)   BMI 25.83 kg/m   Estimated body mass index is 25.83 kg/m  as calculated from the following:    Height as of this encounter: 1.753 m (5' 9\").    Weight as of this encounter: 79.3 kg (174 lb 14.4 oz).  BP completed using cuff size: regular    Questioned patient about current smoking habits.  Pt. has never smoked.          The following HM Due: NONE             "

## 2019-01-22 NOTE — PATIENT INSTRUCTIONS
Will call with results   Mahi will call you to schedule the surgery     Dr. Stephanie Alicia,     Obstetrics and Gynecology  [facility] and [place]

## 2019-01-22 NOTE — TELEPHONE ENCOUNTER
Procedure name(s) - multi select robotic assisted laparoscopic endometriosis resection/fulguration, hysterosccopic septum removal and dilation and curettage, chromotubation    Reason for procedure pelvic pain, septate uterus, and menorrhagia    Is this a multi surgeon case? No    Laterality Bilateral    Request for additional equipment Other (see comments) None   Anesthesia General    Initiate Pre-op orders for above procedure: Yes, as ordered in Epic Additional orders noted there also   Location of Case: [facility] OR    PA Assistant: No    Surgical Assistant Katelynn Guillermo SA    Urgency of Surgery: Routine    Surgeon Procedure Time (incision to closure) in minutes (per procedure as applicable) 120    Note:  Surgical Case Time Needed (in minutes)   Date of Surgery (Requested): 1/22/2019 next available    Patient Class (for admit prior to surgery, specify number of days in comments): Same day (hospital outpatient)    Why can t this outpatient surgery be done at the [facility] or  ASC? robotic    H&P To Be Completed By: Surgeon    Post-Op Appointment 1.5 week    Bowel Prep: Yes    Vendor Needed? No

## 2019-01-23 LAB — COPATH REPORT: NORMAL

## 2019-01-24 NOTE — TELEPHONE ENCOUNTER
Type of surgery: robotic assisted laparoscopic endometriosis resection/fulguration, hysteroscopic septum removal and dilation and curettage, chromotubation  Location of surgery: [facility]  Date and time of surgery: 3/6/19 @ 9:55 am  Surgeon: Dr. Alicia  Pre-Op Appt Date: 2/26/19  Post-Op Appt Date: 3/14/19   Packet sent out: Yes  Pre-cert/Authorization completed:  No  Date: 1/24/19

## 2019-01-29 ENCOUNTER — TELEPHONE (OUTPATIENT)
Dept: OBGYN | Facility: CLINIC | Age: 24
End: 2019-01-29

## 2019-01-30 NOTE — TELEPHONE ENCOUNTER
Hi, I think I clicked the wrong button!    Yes it is no dysplasia was seen, recommend same treatment,   Repeat pap in 1 year         Dr. Stephanie Alicia, DO    Obstetrics and Gynecology  [facility] and [place]

## 2019-01-30 NOTE — TELEPHONE ENCOUNTER
Hi Dr. Mendoza's,  I just wanted to verify this [place] result. It was reported to the pt as CINDY I, but I'm not seeing any when looking at the result below. Please advise.   Thank you,  Olga Alexis RN-Pap Tracking     SPECIMEN(S):   A: Cervical biopsy, 12 o'clock   B: Cervical biopsy, 4 o'clock   C: Cervical biopsy, 8 o'clock   D: Endocervical curettings     FINAL DIAGNOSIS:   A: Cervix, 12:00, biopsy.   - Cervical/ectocervical squamous epithelium without evidence of dysplasia.   - Endocervical transformation zone not present.     B: Cervix, 4:00, biopsy.   - Negative for dysplasia and malignancy.     C: Cervix, 8:00, biopsy.   - Cervical/ectocervical squamous epithelium without evidence of dysplasia.   - Endocervical transformation zone not present.     D: Endocervix, curettage.   - Negative for dysplasia and malignancy.     COMMENT:   Nothing is seen in the current samples which would correlate with positive    HPV testing.  Continued followup is   recommended.     Electronically signed out by:     Amish Espinoza M.D.

## 2019-02-05 PROBLEM — R10.2 PELVIC PAIN IN FEMALE: Status: ACTIVE | Noted: 2019-02-05

## 2019-02-27 ENCOUNTER — MYC MEDICAL ADVICE (OUTPATIENT)
Dept: OBGYN | Facility: CLINIC | Age: 24
End: 2019-02-27

## 2019-02-27 ENCOUNTER — OFFICE VISIT (OUTPATIENT)
Dept: FAMILY MEDICINE | Facility: CLINIC | Age: 24
End: 2019-02-27
Payer: OTHER GOVERNMENT

## 2019-02-27 VITALS
DIASTOLIC BLOOD PRESSURE: 98 MMHG | TEMPERATURE: 98 F | OXYGEN SATURATION: 99 % | RESPIRATION RATE: 16 BRPM | WEIGHT: 173.3 LBS | HEART RATE: 74 BPM | BODY MASS INDEX: 25.67 KG/M2 | HEIGHT: 69 IN | SYSTOLIC BLOOD PRESSURE: 130 MMHG

## 2019-02-27 DIAGNOSIS — R03.0 ELEVATED BLOOD PRESSURE READING WITHOUT DIAGNOSIS OF HYPERTENSION: ICD-10-CM

## 2019-02-27 DIAGNOSIS — R10.2 PELVIC PAIN IN FEMALE: ICD-10-CM

## 2019-02-27 DIAGNOSIS — Z01.818 PREOP GENERAL PHYSICAL EXAM: Primary | ICD-10-CM

## 2019-02-27 PROBLEM — Z98.890 S/P LEEP: Status: RESOLVED | Noted: 2018-10-11 | Resolved: 2019-02-27

## 2019-02-27 LAB
ERYTHROCYTE [DISTWIDTH] IN BLOOD BY AUTOMATED COUNT: 11.9 % (ref 10–15)
HCT VFR BLD AUTO: 39.9 % (ref 35–47)
HGB BLD-MCNC: 13.9 G/DL (ref 11.7–15.7)
MCH RBC QN AUTO: 30.8 PG (ref 26.5–33)
MCHC RBC AUTO-ENTMCNC: 34.8 G/DL (ref 31.5–36.5)
MCV RBC AUTO: 89 FL (ref 78–100)
PLATELET # BLD AUTO: 277 10E9/L (ref 150–450)
RBC # BLD AUTO: 4.51 10E12/L (ref 3.8–5.2)
WBC # BLD AUTO: 7 10E9/L (ref 4–11)

## 2019-02-27 PROCEDURE — 99214 OFFICE O/P EST MOD 30 MIN: CPT | Performed by: FAMILY MEDICINE

## 2019-02-27 PROCEDURE — 36415 COLL VENOUS BLD VENIPUNCTURE: CPT | Performed by: FAMILY MEDICINE

## 2019-02-27 PROCEDURE — 85027 COMPLETE CBC AUTOMATED: CPT | Performed by: FAMILY MEDICINE

## 2019-02-27 ASSESSMENT — MIFFLIN-ST. JEOR: SCORE: 1605.46

## 2019-02-27 NOTE — PROGRESS NOTES
[facility]  [id number] [place] [id number]  [phone]  Dept: [phone]    PRE-OP EVALUATION:  Today's date: 2019    Kindra Gresham (: 1995) presents for pre-operative evaluation assessment, as requested by Dr. Alicia.  She requires evaluation and anesthesia risk assessment prior to undergoing surgery/procedure for the treatment of endometriosis.    Fax number for surgical facility:   Primary Physician: Drea Chau  Type of Anesthesia Anticipated: General    Patient has a Health Care Directive or Living Will:  NO    Preop Questions 2019   Who is doing your surgery? Dr. Alicia   What are you having done? Robotic assisted laparoscopic endometriosis resection/fulguration, hysteroscopic septum removal and dilation and curettage, and chromotubation   Date of Surgery/Procedure: 19   Facility or Hospital where procedure/surgery will be performed: [facility]   1.  Do you have a history of Heart attack, stroke, stent, coronary bypass surgery, or other heart surgery? No   2.  Do you ever have any pain or discomfort in your chest? No   3.  Do you have a history of  Heart Failure? No   4.   Are you troubled by shortness of breath when:  walking on a level surface, or up a slight hill, or at night? No   5.  Do you currently have a cold, bronchitis or other respiratory infection? No   6.  Do you have a cough, shortness of breath, or wheezing? No   7.  Do you sometimes get pains in the calves of your legs when you walk? No   8. Do you or anyone in your family have previous history of blood clots? YES - See Family History.   9.  Do you or does anyone in your family have a serious bleeding problem such as prolonged bleeding following surgeries or cuts? No   10. Have you ever had problems with anemia or been told to take iron pills? No   11. Have you had any abnormal blood loss such as black, tarry or bloody stools, or abnormal  "vaginal bleeding? No   12. Have you ever had a blood transfusion? No   13. Have you or any of your relatives ever had problems with anesthesia? No   14. Do you have sleep apnea, excessive snoring or daytime drowsiness? No   15. Do you have any prosthetic heart valves? No   16. Do you have prosthetic joints? No   17. Is there any chance that you may be pregnant? No       HPI:     HPI related to upcoming procedure: The patient is a 24-year-old female, who has been trying to get pregnant with her  the past 5-6 months.  Patient has a history of pelvic pain, likely related to endometriosis.  Patient presents for a preoperative physical prior to robotic assisted laparoscopic endometriosis resection/fulguration, hysteroscopic septum removal and dilation and curettage, and chromotubation.  Current pelvic pain is tolerable.  Patient has been on OCP treatment the past 4 weeks, without significant improvement in her pelvic pain.  LMP was 2/4/19.  Blood pressure is slightly elevated in clinic today, as noted.    Patient states that she has \"bad knees, but she is able to do the elliptical and walk 3-4 times/week without chest pain, shortness of breath, or exertional symptoms.    MEDICAL HISTORY:     Patient Active Problem List    Diagnosis Date Noted     Pelvic pain in female 02/05/2019     Priority: Medium     HSIL (high grade squamous intraepithelial lesion) on Pap smear of cervix 10/11/2018     Priority: Medium      Past Medical History:   Diagnosis Date     Abnormal Pap smear of cervix 03/28/2018 03/28/18: See problem list.      S/P LEEP 10/11/2018    03/28/18: ASC-H and LSIL pap (abstracted records) 03/28/18: [place] HSIL,CINDY 2 with LSIL also present ECC Neg for dysplasia (abstracted records) 04/20/18: LEEP CINDY 2 with LSIL also present, margins clear. (abstracted records) 12/03/18: NIL pap, + HR HPV (not 16 or 18) result. Plan [place] per provider.  01/22/19: [place] Neg for dysplasia. Plan cotest in 1 year.      Past " Surgical History:   Procedure Laterality Date     LEEP TX, CERVICAL  04/20/2018 04/20/18: LEEP CINDY 2 with LSIL also present, margins clear. (abstracted records)     ORTHOPEDIC SURGERY Right 2017    Arthroscopy Right Knee     Current Outpatient Medications   Medication Sig Dispense Refill     norgestimate-ethinyl estradiol (ORTHO-CYCLEN/SPRINTEC) 0.25-35 MG-MCG tablet Take 1 tablet by mouth daily 84 tablet 4     Prenatal Vit-Fe Fumarate-FA (PRENATAL VITAMIN PO) Take 1 tablet by mouth       cyclobenzaprine (FLEXERIL) 5 MG tablet Take at night PRN (Patient not taking: Reported on 1/22/2019) 30 tablet 1     diclofenac (CATAFLAM) 50 MG tablet Take 1 tablet (50 mg) by mouth 3 times daily (Patient not taking: Reported on 1/22/2019) 270 tablet 3     OTC products: Last dose Ibuprofen 2 weeks ago.  No NSAID use since that time.  Tylenol as needed for pain.    No Known Allergies     Latex Allergy: NO    Social History     Tobacco Use     Smoking status: Never Smoker     Smokeless tobacco: Never Used   Substance Use Topics     Alcohol use: Yes     Comment: Occasional     History   Drug Use No       REVIEW OF SYSTEMS:   CONSTITUTIONAL: NEGATIVE for fever, chills, change in weight  INTEGUMENTARY/SKIN: NEGATIVE for worrisome rashes, moles or lesions  EYES: Wears reading glasses since seeing Optometry a few months ago.  NEGATIVE for eye irritation  ENT/MOUTH: NEGATIVE for ear, mouth and throat problems  RESP: NEGATIVE for significant cough or shortness of breath   BREAST: NEGATIVE for masses, tenderness or discharge  CV: NEGATIVE for chest pain, palpitations or peripheral edema  GI: Intermittent nausea and abdominal/pelvic pain.  NEGATIVE for heartburn or change in bowel habits  : NEGATIVE for frequency, dysuria, or hematuria  MUSCULOSKELETAL: NEGATIVE for significant arthralgias or myalgia  NEURO: NEGATIVE for headache, weakness, dizziness or paresthesias  ENDOCRINE: NEGATIVE for temperature intolerance, skin/hair  "changes  HEME: NEGATIVE for bleeding problems  PSYCHIATRIC: NEGATIVE for changes in mood or affect    EXAM:   BP (!) 130/98 (BP Location: Right arm, Patient Position: Chair, Cuff Size: Adult Regular)   Pulse 74   Temp 98  F (36.7  C) (Oral)   Resp 16   Ht 1.753 m (5' 9\")   Wt 78.6 kg (173 lb 4.8 oz)   LMP 02/04/2019 (Exact Date)   SpO2 99%   Breastfeeding? No   BMI 25.59 kg/m      GENERAL APPEARANCE: healthy, alert and no distress     EYES: EOMI, PERRL     HENT: ear canals and TM's normal and nose and mouth without ulcers or lesions     NECK: no adenopathy, no asymmetry, masses, or scars and thyroid normal to palpation     RESP: lungs clear to auscultation - no rales, rhonchi or wheezes     CV: regular rates and rhythm, normal S1 S2, no S3 or S4 and no murmur, click or rub     ABDOMEN:  soft, nontender, no HSM or masses and bowel sounds normal     MS: extremities normal- no gross deformities noted, no evidence of inflammation in joints, FROM in all extremities.     SKIN: no suspicious lesions or rashes     NEURO: Normal strength and tone, sensory exam grossly normal, mentation intact and speech normal     PSYCH: mentation appears normal. and affect normal/bright     LYMPHATICS: No cervical adenopathy    DIAGNOSTICS:   EKG: Not indicated due to non-vascular surgery and low risk of event (age <65 and without cardiac risk factors)    No results for input(s): HGB, PLT, INR, NA, POTASSIUM, CR, A1C in the last 45656 hours.   LABORATORY:  Office Visit on 02/27/2019   Component Date Value Ref Range Status     WBC 02/27/2019 7.0  4.0 - 11.0 10e9/L Final     RBC Count 02/27/2019 4.51  3.8 - 5.2 10e12/L Final     Hemoglobin 02/27/2019 13.9  11.7 - 15.7 g/dL Final     Hematocrit 02/27/2019 39.9  35.0 - 47.0 % Final     MCV 02/27/2019 89  78 - 100 fl Final     MCH 02/27/2019 30.8  26.5 - 33.0 pg Final     MCHC 02/27/2019 34.8  31.5 - 36.5 g/dL Final     RDW 02/27/2019 11.9  10.0 - 15.0 % Final     Platelet Count " 02/27/2019 277  150 - 450 10e9/L Final      TSH was 1.49 on 12/17/18.    IMPRESSION:   Reason for surgery/procedure:  Robotic assisted laparoscopic endometriosis resection/fulguration, hysteroscopic septum removal and dilation and curettage, and chromotubation  Diagnosis/reason for consult: Preoperative physical.    The proposed surgical procedure is considered INTERMEDIATE risk.    REVISED CARDIAC RISK INDEX  The patient has the following serious cardiovascular risks for perioperative complications such as (MI, PE, VFib and 3  AV Block):  No serious cardiac risks  INTERPRETATION: 0 risks: Class I (very low risk - 0.4% complication rate)    The patient has the following additional risks for perioperative complications:  No identified additional risks      ICD-10-CM    1. Preop general physical exam Z01.818 CBC with platelets   2. Pelvic pain in female R10.2    3. Elevated blood pressure reading without diagnosis of hypertension, recently started on OCP treatment. R03.0        RECOMMENDATIONS:     --Discussed risks and benefits of continuing OCP treatment perioperatively (which patient would like to continue), which can increase the risk of DVT/PE.  --Discussed the potential impact of OCP treatment on blood pressure.    --Patient is to take all scheduled medications on the day of surgery EXCEPT for modifications listed below.   --Avoid NSAIDs and Aspirin prior to surgery, as advised.   --Do not stop your OCP unless directed by your OB/GYN, as advised.    --A blood pressure recheck is recommended Friday, 3/1/19.   --If the patient's blood pressure remains elevated, an additional lab (e.g. BMP) and EKG will be considered.    APPROVAL GIVEN to proceed with proposed procedure, without further diagnostic evaluation, assuming that the follow up blood pressure is within normal limits/acceptable.  A Urine Pregnancy Test is recommended the day of surgery, as discussed.    ADDENDUM 3/4/19:  Reviewed the patient's recent blood  pressures.  Follow-up blood pressure was 139/82 on 3/1/19, noted to be 139/94 during her Urgent Care evaluation 3/2/19.  Urinalysis was negative 3/2/19, as noted.  Patient may proceed with surgery without a further workup, assuming that her Urine Pregnancy Test is negative, her blood pressure is within normal limits/acceptable, and she is without UTI/illness symptoms.  Patient should monitor her blood pressures and follow up if her blood pressure is consistently > 140/90.  Consider discontinuing the OCP treatment if the patient's blood pressure remains elevated.    Signed Electronically by: Chika Palma MD    Copy of this evaluation report is provided to requesting physician.    [facility] Preop Guidelines    Revised Cardiac Risk Index

## 2019-02-27 NOTE — TELEPHONE ENCOUNTER
Please see my chart message and advise.    Type of surgery: robotic assisted laparoscopic endometriosis resection/fulguration, hysteroscopic septum removal and dilation and curettage, chromotubation  Location of surgery: [facility]  Date and time of surgery: 3/6/19 @ 9:55 am    Madalyn Starks RN

## 2019-02-27 NOTE — TELEPHONE ENCOUNTER
Chart reviewed.  Healthy 25 yo does not need to discontinue OCPs prior to surgery.    Spoke with patient and advised her to continue OCPs until surgery

## 2019-02-27 NOTE — PATIENT INSTRUCTIONS
A blood pressure recheck is recommended Friday, 3/1/19.    If your blood pressure remains elevated, an additional lab (e.g. BMP) will be considered.    Before Your Surgery      Call your surgeon if there is any change in your health. This includes signs of a cold or flu (such as a sore throat, runny nose, cough, rash or fever).    Do not smoke, drink alcohol or take over the counter medicine (unless your surgeon or primary care doctor tells you to) for the 24 hours before and after surgery.    If you take prescribed drugs: Follow your doctor s orders about which medicines to take and which to stop until after surgery.  o Avoid NSAIDs and Aspirin prior to surgery, as advised.  o Do not stop your OCP unless directed by your OB/GYN, as advised.    Eating and drinking prior to surgery: follow the instructions from your surgeon    Take a shower or bath the night before surgery. Use the soap your surgeon gave you to gently clean your skin. If you do not have soap from your surgeon, use your regular soap. Do not shave or scrub the surgery site.  Wear clean pajamas and have clean sheets on your bed.

## 2019-03-01 ENCOUNTER — TELEPHONE (OUTPATIENT)
Dept: FAMILY MEDICINE | Facility: CLINIC | Age: 24
End: 2019-03-01

## 2019-03-01 ENCOUNTER — ALLIED HEALTH/NURSE VISIT (OUTPATIENT)
Dept: NURSING | Facility: CLINIC | Age: 24
End: 2019-03-01
Payer: OTHER GOVERNMENT

## 2019-03-01 VITALS — DIASTOLIC BLOOD PRESSURE: 82 MMHG | OXYGEN SATURATION: 99 % | SYSTOLIC BLOOD PRESSURE: 139 MMHG | HEART RATE: 107 BPM

## 2019-03-01 DIAGNOSIS — Z01.30 BP CHECK: Primary | ICD-10-CM

## 2019-03-01 NOTE — TELEPHONE ENCOUNTER
BP Readings from Last 3 Encounters:   02/27/19 (!) 130/98   01/22/19 154/90   12/17/18 126/82     Today bp was 139/82  Pulse 107    Patient not on bp medication -  having surgery March 6    Pt. Can be reached through [employer]

## 2019-03-02 ENCOUNTER — OFFICE VISIT (OUTPATIENT)
Dept: URGENT CARE | Facility: URGENT CARE | Age: 24
End: 2019-03-02
Payer: OTHER GOVERNMENT

## 2019-03-02 VITALS
HEART RATE: 89 BPM | OXYGEN SATURATION: 98 % | DIASTOLIC BLOOD PRESSURE: 94 MMHG | SYSTOLIC BLOOD PRESSURE: 139 MMHG | TEMPERATURE: 97.7 F

## 2019-03-02 DIAGNOSIS — R30.0 DYSURIA: Primary | ICD-10-CM

## 2019-03-02 LAB
ALBUMIN UR-MCNC: NEGATIVE MG/DL
APPEARANCE UR: CLEAR
BILIRUB UR QL STRIP: NEGATIVE
COLOR UR AUTO: YELLOW
GLUCOSE UR STRIP-MCNC: NEGATIVE MG/DL
HGB UR QL STRIP: NEGATIVE
KETONES UR STRIP-MCNC: NEGATIVE MG/DL
LEUKOCYTE ESTERASE UR QL STRIP: NEGATIVE
NITRATE UR QL: NEGATIVE
PH UR STRIP: 7 PH (ref 5–7)
SOURCE: NORMAL
SP GR UR STRIP: 1.01 (ref 1–1.03)
UROBILINOGEN UR STRIP-ACNC: 0.2 EU/DL (ref 0.2–1)

## 2019-03-02 PROCEDURE — 99213 OFFICE O/P EST LOW 20 MIN: CPT | Performed by: FAMILY MEDICINE

## 2019-03-02 PROCEDURE — 81003 URINALYSIS AUTO W/O SCOPE: CPT | Performed by: FAMILY MEDICINE

## 2019-03-02 NOTE — PROGRESS NOTES
SUBJECTIVE:  Chief Complaint   Patient presents with     Urgent Care     Urinary Problem     Possible UTI started yesteday- dysuria, frequency, hesitancy, urgency, fatigue, spotting yesterday        Kindra Gresham is a 24 year old female who  presents today for a possible UTI. Symptoms of dysuria, urgency and frequency have been going on for 1day(s).  Hematuria no- though she has little spotting with suspected start of menses. .  gradual onset, still present and improved and mild.  There is no history of fever, chills, nausea or vomiting.  No history of vaginal   discharge. This patient does not have a history of urinary tract infections. Patient denies long duration, rigors, flank pain, temperature > 101 degrees F. and Vomiting, significant nausea or diarrhea or vaginal discharge and vaginal itching, has some odor.    She has scheduled laposcopic surgery in 3 days for treatment of endometriosis, and bifid uterus.  She wants to be sure she has no bladder infection going into the surgery    Past Medical History:   Diagnosis Date     Abnormal Pap smear of cervix 03/28/2018 03/28/18: See problem list.      S/P LEEP 10/11/2018    03/28/18: [facility]-H and LSIL pap (abstracted records) 03/28/18: [place] HSIL,CINDY 2 with LSIL also present ECC Neg for dysplasia (abstracted records) 04/20/18: LEEP CINDY 2 with LSIL also present, margins clear. (abstracted records) 12/03/18: NIL pap, + HR HPV (not 16 or 18) result. Plan [facility] per provider.  01/22/19: [facility] Neg for dysplasia. Plan cotest in 1 year.      Patient Active Problem List   Diagnosis     HSIL (high grade squamous intraepithelial lesion) on Pap smear of cervix     Pelvic pain in female       ALLERGIES:  Patient has no known allergies.      Current Outpatient Medications on File Prior to Visit:  norgestimate-ethinyl estradiol (ORTHO-CYCLEN/SPRINTEC) 0.25-35 MG-MCG tablet Take 1 tablet by mouth daily   cyclobenzaprine (FLEXERIL) 5 MG tablet Take at night PRN (Patient  not taking: Reported on 1/22/2019)   diclofenac (CATAFLAM) 50 MG tablet Take 1 tablet (50 mg) by mouth 3 times daily (Patient not taking: Reported on 1/22/2019)   Prenatal Vit-Fe Fumarate-FA (PRENATAL VITAMIN PO) Take 1 tablet by mouth     No current facility-administered medications on file prior to visit.     Social History     Tobacco Use     Smoking status: Never Smoker     Smokeless tobacco: Never Used   Substance Use Topics     Alcohol use: Yes     Comment: Occasional       Family History   Problem Relation Age of Onset     Pulmonary Embolism Father         Dx-57     Myocardial Infarction Maternal Grandfather         Dx-68     Endometriosis Paternal Grandmother      Deep Vein Thrombosis Paternal Grandmother        ROS:   CONSTITUTIONAL:NEGATIVE for fever, chills,   INTEGUMENTARY/SKIN: NEGATIVE for worrisome rashes,    EYES: NEGATIVE for vision changes or irritation  ENT/MOUTH: NEGATIVE for ear, mouth and throat problems  RESP:NEGATIVE for significant cough or SOB    OBJECTIVE:  BP (!) 139/94 (BP Location: Right arm, Patient Position: Chair, Cuff Size: Adult Regular)   Pulse 89   Temp 97.7  F (36.5  C) (Oral)   LMP 02/04/2019 (Exact Date)   SpO2 98%   GENERAL APPEARANCE: healthy, alert and no distress  RESP: lungs clear to auscultation - no rales, rhonchi or wheezes  CV: regular rates and rhythm, normal S1 S2, no murmur noted  ABDOMEN:  soft, nontender, no HSM or masses and bowel sounds normal  BACK: No CVA tenderness  SKIN: no suspicious lesions or rashes    Results for orders placed or performed in visit on 03/02/19   UA reflex to Microscopic and Culture   Result Value Ref Range    Color Urine Yellow     Appearance Urine Clear     Glucose Urine Negative NEG^Negative mg/dL    Bilirubin Urine Negative NEG^Negative    Ketones Urine Negative NEG^Negative mg/dL    Specific Gravity Urine 1.010 1.003 - 1.035    Blood Urine Negative NEG^Negative    pH Urine 7.0 5.0 - 7.0 pH    Protein Albumin Urine Negative  NEG^Negative mg/dL    Urobilinogen Urine 0.2 0.2 - 1.0 EU/dL    Nitrite Urine Negative NEG^Negative    Leukocyte Esterase Urine Negative NEG^Negative    Source Midstream Urine          ASSESSMENT:   Dysuria     - UA reflex to Microscopic and Culture     We discussed that the urine sample had no/  Minimal signs of pus, blood , bacteria that are usually found in a urinary tract infection.   Other possible causes of dysuria were discussed including irritation from concentrated urine or crystals in the urine,  Chemical irritation from soaps or perfumes,  Vaginal infection, local vaginal trauma like a scratch or a bruise or chafing. Bladder spasm worsened by taking stimulants, or urethritis infection     Patient declined wet prep,  Feels her symptoms are mild and improved  No further testing desired.

## 2019-03-04 NOTE — TELEPHONE ENCOUNTER
Reviewed the patient's recent blood pressures.  Follow-up blood pressure was 139/82 on 3/1/19, noted to be 139/94 during her Urgent Care evaluation 3/2/19.  Urinalysis was negative 3/2/19, as noted.  Patient may proceed with surgery without a further workup, assuming that her Urine Pregnancy Test is negative and she is without UTI/illness symptoms.  Patient should monitor her blood pressures and follow up if her blood pressure is consistently > 140/90.  Thank you.

## 2019-03-05 NOTE — TELEPHONE ENCOUNTER
Relayed the below message to the Pt. She called in reporting someone called her.     Nandini Ring RN -- [facility]

## 2019-03-06 ENCOUNTER — HOSPITAL ENCOUNTER (OUTPATIENT)
Facility: CLINIC | Age: 24
Discharge: HOME | End: 2019-03-06
Attending: FAMILY MEDICINE | Admitting: FAMILY MEDICINE
Payer: OTHER GOVERNMENT

## 2019-03-06 ENCOUNTER — ANESTHESIA (OUTPATIENT)
Dept: SURGERY | Facility: CLINIC | Age: 24
End: 2019-03-06
Payer: OTHER GOVERNMENT

## 2019-03-06 ENCOUNTER — ANESTHESIA EVENT (OUTPATIENT)
Dept: SURGERY | Facility: CLINIC | Age: 24
End: 2019-03-06
Payer: OTHER GOVERNMENT

## 2019-03-06 VITALS
OXYGEN SATURATION: 98 % | RESPIRATION RATE: 14 BRPM | TEMPERATURE: 98.3 F | HEIGHT: 69 IN | SYSTOLIC BLOOD PRESSURE: 121 MMHG | HEART RATE: 60 BPM | DIASTOLIC BLOOD PRESSURE: 81 MMHG | WEIGHT: 171 LBS | BODY MASS INDEX: 25.33 KG/M2

## 2019-03-06 DIAGNOSIS — R10.2 PELVIC PAIN IN FEMALE: ICD-10-CM

## 2019-03-06 DIAGNOSIS — Z98.890 S/P LAPAROSCOPY: Primary | ICD-10-CM

## 2019-03-06 LAB
HCG UR QL: NEGATIVE
HGB BLD-MCNC: 15.5 G/DL (ref 11.7–15.7)

## 2019-03-06 PROCEDURE — 37000009 ZZH ANESTHESIA TECHNICAL FEE, EACH ADDTL 15 MIN: Performed by: FAMILY MEDICINE

## 2019-03-06 PROCEDURE — 25000132 ZZH RX MED GY IP 250 OP 250 PS 637: Performed by: FAMILY MEDICINE

## 2019-03-06 PROCEDURE — 71000027 ZZH RECOVERY PHASE 2 EACH 15 MINS: Performed by: FAMILY MEDICINE

## 2019-03-06 PROCEDURE — 27210794 ZZH OR GENERAL SUPPLY STERILE: Performed by: FAMILY MEDICINE

## 2019-03-06 PROCEDURE — 25000128 H RX IP 250 OP 636: Performed by: FAMILY MEDICINE

## 2019-03-06 PROCEDURE — 88305 TISSUE EXAM BY PATHOLOGIST: CPT | Performed by: FAMILY MEDICINE

## 2019-03-06 PROCEDURE — 36000087 ZZH SURGERY LEVEL 8 EA 15 ADDTL MIN: Performed by: FAMILY MEDICINE

## 2019-03-06 PROCEDURE — 71000013 ZZH RECOVERY PHASE 1 LEVEL 1 EA ADDTL HR: Performed by: FAMILY MEDICINE

## 2019-03-06 PROCEDURE — 25800030 ZZH RX IP 258 OP 636: Performed by: NURSE ANESTHETIST, CERTIFIED REGISTERED

## 2019-03-06 PROCEDURE — 85018 HEMOGLOBIN: CPT | Performed by: FAMILY MEDICINE

## 2019-03-06 PROCEDURE — 81025 URINE PREGNANCY TEST: CPT | Performed by: FAMILY MEDICINE

## 2019-03-06 PROCEDURE — 88305 TISSUE EXAM BY PATHOLOGIST: CPT | Mod: 26 | Performed by: FAMILY MEDICINE

## 2019-03-06 PROCEDURE — 58560 HYSTEROSCOPY RESECT SEPTUM: CPT | Performed by: FAMILY MEDICINE

## 2019-03-06 PROCEDURE — 25000128 H RX IP 250 OP 636: Performed by: ANESTHESIOLOGY

## 2019-03-06 PROCEDURE — 40000306 ZZH STATISTIC PRE PROC ASSESS II: Performed by: FAMILY MEDICINE

## 2019-03-06 PROCEDURE — 37000008 ZZH ANESTHESIA TECHNICAL FEE, 1ST 30 MIN: Performed by: FAMILY MEDICINE

## 2019-03-06 PROCEDURE — 25000125 ZZHC RX 250: Performed by: NURSE ANESTHETIST, CERTIFIED REGISTERED

## 2019-03-06 PROCEDURE — 58558 HYSTEROSCOPY BIOPSY: CPT | Mod: 59 | Performed by: FAMILY MEDICINE

## 2019-03-06 PROCEDURE — 25000125 ZZHC RX 250: Performed by: FAMILY MEDICINE

## 2019-03-06 PROCEDURE — 25000128 H RX IP 250 OP 636: Performed by: NURSE ANESTHETIST, CERTIFIED REGISTERED

## 2019-03-06 PROCEDURE — 36000085 ZZH SURGERY LEVEL 8 1ST 30 MIN: Performed by: FAMILY MEDICINE

## 2019-03-06 PROCEDURE — 49320 DIAG LAPARO SEPARATE PROC: CPT | Performed by: FAMILY MEDICINE

## 2019-03-06 PROCEDURE — 71000012 ZZH RECOVERY PHASE 1 LEVEL 1 FIRST HR: Performed by: FAMILY MEDICINE

## 2019-03-06 PROCEDURE — S2900 ROBOTIC SURGICAL SYSTEM: HCPCS | Performed by: FAMILY MEDICINE

## 2019-03-06 RX ORDER — PROPOFOL 10 MG/ML
INJECTION, EMULSION INTRAVENOUS PRN
Status: DISCONTINUED | OUTPATIENT
Start: 2019-03-06 | End: 2019-03-06

## 2019-03-06 RX ORDER — IBUPROFEN 800 MG/1
800 TABLET, FILM COATED ORAL EVERY 8 HOURS PRN
Qty: 60 TABLET | Refills: 0 | Status: SHIPPED | OUTPATIENT
Start: 2019-03-06

## 2019-03-06 RX ORDER — CEFAZOLIN SODIUM 1 G/3ML
1 INJECTION, POWDER, FOR SOLUTION INTRAMUSCULAR; INTRAVENOUS SEE ADMIN INSTRUCTIONS
Status: DISCONTINUED | OUTPATIENT
Start: 2019-03-06 | End: 2019-03-06 | Stop reason: HOSPADM

## 2019-03-06 RX ORDER — METOCLOPRAMIDE 10 MG/1
10 TABLET ORAL EVERY 6 HOURS PRN
Status: DISCONTINUED | OUTPATIENT
Start: 2019-03-06 | End: 2019-03-06 | Stop reason: HOSPADM

## 2019-03-06 RX ORDER — PHENAZOPYRIDINE HYDROCHLORIDE 200 MG/1
200 TABLET, FILM COATED ORAL ONCE
Status: COMPLETED | OUTPATIENT
Start: 2019-03-06 | End: 2019-03-06

## 2019-03-06 RX ORDER — OXYCODONE HYDROCHLORIDE 5 MG/1
5 TABLET ORAL EVERY 6 HOURS PRN
Status: DISCONTINUED | OUTPATIENT
Start: 2019-03-06 | End: 2019-03-06 | Stop reason: HOSPADM

## 2019-03-06 RX ORDER — MEPERIDINE HYDROCHLORIDE 50 MG/ML
12.5 INJECTION INTRAMUSCULAR; INTRAVENOUS; SUBCUTANEOUS
Status: DISCONTINUED | OUTPATIENT
Start: 2019-03-06 | End: 2019-03-06 | Stop reason: HOSPADM

## 2019-03-06 RX ORDER — FENTANYL CITRATE 50 UG/ML
25-50 INJECTION, SOLUTION INTRAMUSCULAR; INTRAVENOUS
Status: DISCONTINUED | OUTPATIENT
Start: 2019-03-06 | End: 2019-03-06 | Stop reason: HOSPADM

## 2019-03-06 RX ORDER — KETOROLAC TROMETHAMINE 30 MG/ML
30 INJECTION, SOLUTION INTRAMUSCULAR; INTRAVENOUS ONCE
Status: COMPLETED | OUTPATIENT
Start: 2019-03-06 | End: 2019-03-06

## 2019-03-06 RX ORDER — ACETAMINOPHEN 325 MG/1
975 TABLET ORAL ONCE
Status: COMPLETED | OUTPATIENT
Start: 2019-03-06 | End: 2019-03-06

## 2019-03-06 RX ORDER — GLYCOPYRROLATE 0.2 MG/ML
INJECTION, SOLUTION INTRAMUSCULAR; INTRAVENOUS PRN
Status: DISCONTINUED | OUTPATIENT
Start: 2019-03-06 | End: 2019-03-06

## 2019-03-06 RX ORDER — ONDANSETRON 2 MG/ML
INJECTION INTRAMUSCULAR; INTRAVENOUS PRN
Status: DISCONTINUED | OUTPATIENT
Start: 2019-03-06 | End: 2019-03-06

## 2019-03-06 RX ORDER — PROPOFOL 10 MG/ML
INJECTION, EMULSION INTRAVENOUS CONTINUOUS PRN
Status: DISCONTINUED | OUTPATIENT
Start: 2019-03-06 | End: 2019-03-06

## 2019-03-06 RX ORDER — LIDOCAINE HYDROCHLORIDE 10 MG/ML
INJECTION, SOLUTION INFILTRATION; PERINEURAL PRN
Status: DISCONTINUED | OUTPATIENT
Start: 2019-03-06 | End: 2019-03-06

## 2019-03-06 RX ORDER — FENTANYL CITRATE 50 UG/ML
INJECTION, SOLUTION INTRAMUSCULAR; INTRAVENOUS PRN
Status: DISCONTINUED | OUTPATIENT
Start: 2019-03-06 | End: 2019-03-06

## 2019-03-06 RX ORDER — KETOROLAC TROMETHAMINE 30 MG/ML
INJECTION, SOLUTION INTRAMUSCULAR; INTRAVENOUS PRN
Status: DISCONTINUED | OUTPATIENT
Start: 2019-03-06 | End: 2019-03-06

## 2019-03-06 RX ORDER — DEXAMETHASONE SODIUM PHOSPHATE 4 MG/ML
4 INJECTION, SOLUTION INTRA-ARTICULAR; INTRALESIONAL; INTRAMUSCULAR; INTRAVENOUS; SOFT TISSUE EVERY 10 MIN PRN
Status: DISCONTINUED | OUTPATIENT
Start: 2019-03-06 | End: 2019-03-06 | Stop reason: HOSPADM

## 2019-03-06 RX ORDER — CEFAZOLIN SODIUM 2 G/100ML
2 INJECTION, SOLUTION INTRAVENOUS
Status: COMPLETED | OUTPATIENT
Start: 2019-03-06 | End: 2019-03-06

## 2019-03-06 RX ORDER — LABETALOL 20 MG/4 ML (5 MG/ML) INTRAVENOUS SYRINGE
10
Status: DISCONTINUED | OUTPATIENT
Start: 2019-03-06 | End: 2019-03-06 | Stop reason: HOSPADM

## 2019-03-06 RX ORDER — OXYCODONE HYDROCHLORIDE 5 MG/1
5 TABLET ORAL EVERY 6 HOURS PRN
Qty: 30 TABLET | Refills: 0 | Status: SHIPPED | OUTPATIENT
Start: 2019-03-06 | End: 2019-03-14

## 2019-03-06 RX ORDER — DEXAMETHASONE SODIUM PHOSPHATE 4 MG/ML
INJECTION, SOLUTION INTRA-ARTICULAR; INTRALESIONAL; INTRAMUSCULAR; INTRAVENOUS; SOFT TISSUE PRN
Status: DISCONTINUED | OUTPATIENT
Start: 2019-03-06 | End: 2019-03-06

## 2019-03-06 RX ORDER — METOCLOPRAMIDE HYDROCHLORIDE 5 MG/ML
10 INJECTION INTRAMUSCULAR; INTRAVENOUS EVERY 6 HOURS PRN
Status: DISCONTINUED | OUTPATIENT
Start: 2019-03-06 | End: 2019-03-06 | Stop reason: HOSPADM

## 2019-03-06 RX ORDER — KETAMINE HYDROCHLORIDE 10 MG/ML
INJECTION INTRAMUSCULAR; INTRAVENOUS PRN
Status: DISCONTINUED | OUTPATIENT
Start: 2019-03-06 | End: 2019-03-06

## 2019-03-06 RX ORDER — HYDROMORPHONE HYDROCHLORIDE 1 MG/ML
.3-.5 INJECTION, SOLUTION INTRAMUSCULAR; INTRAVENOUS; SUBCUTANEOUS EVERY 10 MIN PRN
Status: DISCONTINUED | OUTPATIENT
Start: 2019-03-06 | End: 2019-03-06 | Stop reason: HOSPADM

## 2019-03-06 RX ORDER — SODIUM CHLORIDE, SODIUM LACTATE, POTASSIUM CHLORIDE, CALCIUM CHLORIDE 600; 310; 30; 20 MG/100ML; MG/100ML; MG/100ML; MG/100ML
INJECTION, SOLUTION INTRAVENOUS CONTINUOUS
Status: DISCONTINUED | OUTPATIENT
Start: 2019-03-06 | End: 2019-03-06 | Stop reason: HOSPADM

## 2019-03-06 RX ORDER — HYDRALAZINE HYDROCHLORIDE 20 MG/ML
2.5-5 INJECTION INTRAMUSCULAR; INTRAVENOUS EVERY 10 MIN PRN
Status: DISCONTINUED | OUTPATIENT
Start: 2019-03-06 | End: 2019-03-06 | Stop reason: HOSPADM

## 2019-03-06 RX ORDER — ONDANSETRON 2 MG/ML
4 INJECTION INTRAMUSCULAR; INTRAVENOUS EVERY 30 MIN PRN
Status: DISCONTINUED | OUTPATIENT
Start: 2019-03-06 | End: 2019-03-06 | Stop reason: HOSPADM

## 2019-03-06 RX ORDER — DOCUSATE SODIUM 100 MG/1
100 CAPSULE, LIQUID FILLED ORAL 2 TIMES DAILY
Qty: 60 CAPSULE | Refills: 0 | Status: SHIPPED | OUTPATIENT
Start: 2019-03-06 | End: 2019-03-14

## 2019-03-06 RX ORDER — BUPIVACAINE HYDROCHLORIDE AND EPINEPHRINE 5; 5 MG/ML; UG/ML
INJECTION, SOLUTION PERINEURAL PRN
Status: DISCONTINUED | OUTPATIENT
Start: 2019-03-06 | End: 2019-03-06 | Stop reason: HOSPADM

## 2019-03-06 RX ORDER — SODIUM CHLORIDE, SODIUM LACTATE, POTASSIUM CHLORIDE, CALCIUM CHLORIDE 600; 310; 30; 20 MG/100ML; MG/100ML; MG/100ML; MG/100ML
INJECTION, SOLUTION INTRAVENOUS CONTINUOUS PRN
Status: DISCONTINUED | OUTPATIENT
Start: 2019-03-06 | End: 2019-03-06

## 2019-03-06 RX ORDER — NEOSTIGMINE METHYLSULFATE 1 MG/ML
VIAL (ML) INJECTION PRN
Status: DISCONTINUED | OUTPATIENT
Start: 2019-03-06 | End: 2019-03-06

## 2019-03-06 RX ORDER — BUPIVACAINE HYDROCHLORIDE 2.5 MG/ML
INJECTION, SOLUTION INFILTRATION; PERINEURAL PRN
Status: DISCONTINUED | OUTPATIENT
Start: 2019-03-06 | End: 2019-03-06 | Stop reason: HOSPADM

## 2019-03-06 RX ORDER — KETOROLAC TROMETHAMINE 30 MG/ML
30 INJECTION, SOLUTION INTRAMUSCULAR; INTRAVENOUS EVERY 6 HOURS PRN
Status: DISCONTINUED | OUTPATIENT
Start: 2019-03-06 | End: 2019-03-06 | Stop reason: HOSPADM

## 2019-03-06 RX ORDER — NALOXONE HYDROCHLORIDE 0.4 MG/ML
.1-.4 INJECTION, SOLUTION INTRAMUSCULAR; INTRAVENOUS; SUBCUTANEOUS
Status: DISCONTINUED | OUTPATIENT
Start: 2019-03-06 | End: 2019-03-06 | Stop reason: HOSPADM

## 2019-03-06 RX ORDER — DIMENHYDRINATE 50 MG/ML
25 INJECTION, SOLUTION INTRAMUSCULAR; INTRAVENOUS
Status: DISCONTINUED | OUTPATIENT
Start: 2019-03-06 | End: 2019-03-06 | Stop reason: HOSPADM

## 2019-03-06 RX ORDER — ONDANSETRON 4 MG/1
4 TABLET, ORALLY DISINTEGRATING ORAL EVERY 30 MIN PRN
Status: DISCONTINUED | OUTPATIENT
Start: 2019-03-06 | End: 2019-03-06 | Stop reason: HOSPADM

## 2019-03-06 RX ADMIN — MIDAZOLAM 2 MG: 1 INJECTION INTRAMUSCULAR; INTRAVENOUS at 10:38

## 2019-03-06 RX ADMIN — FENTANYL CITRATE 100 MCG: 50 INJECTION, SOLUTION INTRAMUSCULAR; INTRAVENOUS at 10:51

## 2019-03-06 RX ADMIN — Medication 20 MG: at 10:57

## 2019-03-06 RX ADMIN — ACETAMINOPHEN 975 MG: 325 TABLET, FILM COATED ORAL at 08:27

## 2019-03-06 RX ADMIN — CEFAZOLIN SODIUM 2 G: 2 INJECTION, SOLUTION INTRAVENOUS at 10:44

## 2019-03-06 RX ADMIN — KETOROLAC TROMETHAMINE 30 MG: 30 INJECTION, SOLUTION INTRAMUSCULAR at 12:03

## 2019-03-06 RX ADMIN — ONDANSETRON 4 MG: 2 INJECTION INTRAMUSCULAR; INTRAVENOUS at 11:54

## 2019-03-06 RX ADMIN — KETOROLAC TROMETHAMINE 30 MG: 30 INJECTION, SOLUTION INTRAMUSCULAR at 08:28

## 2019-03-06 RX ADMIN — LIDOCAINE HYDROCHLORIDE 50 MG: 10 INJECTION, SOLUTION INFILTRATION; PERINEURAL at 10:51

## 2019-03-06 RX ADMIN — SODIUM CHLORIDE, POTASSIUM CHLORIDE, SODIUM LACTATE AND CALCIUM CHLORIDE: 600; 310; 30; 20 INJECTION, SOLUTION INTRAVENOUS at 10:38

## 2019-03-06 RX ADMIN — PHENAZOPYRIDINE 200 MG: 200 TABLET ORAL at 08:27

## 2019-03-06 RX ADMIN — GLYCOPYRROLATE 0.4 MG: 0.2 INJECTION, SOLUTION INTRAMUSCULAR; INTRAVENOUS at 12:01

## 2019-03-06 RX ADMIN — DEXAMETHASONE SODIUM PHOSPHATE 4 MG: 4 INJECTION, SOLUTION INTRA-ARTICULAR; INTRALESIONAL; INTRAMUSCULAR; INTRAVENOUS; SOFT TISSUE at 10:51

## 2019-03-06 RX ADMIN — PROPOFOL 150 MG: 10 INJECTION, EMULSION INTRAVENOUS at 10:51

## 2019-03-06 RX ADMIN — Medication 3 MG: at 12:01

## 2019-03-06 RX ADMIN — OXYCODONE HYDROCHLORIDE 5 MG: 5 TABLET ORAL at 13:02

## 2019-03-06 RX ADMIN — SODIUM CHLORIDE, POTASSIUM CHLORIDE, SODIUM LACTATE AND CALCIUM CHLORIDE: 600; 310; 30; 20 INJECTION, SOLUTION INTRAVENOUS at 11:34

## 2019-03-06 RX ADMIN — PROPOFOL 100 MCG/KG/MIN: 10 INJECTION, EMULSION INTRAVENOUS at 10:51

## 2019-03-06 RX ADMIN — HYDROMORPHONE HYDROCHLORIDE 0.5 MG: 1 INJECTION, SOLUTION INTRAMUSCULAR; INTRAVENOUS; SUBCUTANEOUS at 12:07

## 2019-03-06 RX ADMIN — GLYCOPYRROLATE 0.2 MG: 0.2 INJECTION, SOLUTION INTRAMUSCULAR; INTRAVENOUS at 10:44

## 2019-03-06 RX ADMIN — ROCURONIUM BROMIDE 40 MG: 10 INJECTION INTRAVENOUS at 10:51

## 2019-03-06 RX ADMIN — FENTANYL CITRATE 100 MCG: 50 INJECTION, SOLUTION INTRAMUSCULAR; INTRAVENOUS at 11:32

## 2019-03-06 RX ADMIN — FENTANYL CITRATE 50 MCG: 50 INJECTION, SOLUTION INTRAMUSCULAR; INTRAVENOUS at 13:15

## 2019-03-06 ASSESSMENT — MIFFLIN-ST. JEOR: SCORE: 1595.03

## 2019-03-06 NOTE — DISCHARGE SUMMARY
23 y/o  wtih uterine septum and pelvic pain and pcos s/p robotic assisted laparoscopic endometriosis resection, chromotubation, hysteroscopic septum removal, dilation and curettage with polypectomy  Doing well   discharge home   Dr. Stephanie Alicia, DO    Obstetrics and Gynecology  [facility] - [place] and [place]

## 2019-03-06 NOTE — ANESTHESIA POSTPROCEDURE EVALUATION
Patient: Kindra Gresham    Procedure(s):  robotic assisted laparoscopic endometriosis resection/fulguration, hysterscopic septum removal, dilation and curettage, chromtubation  COMBINED DILATION AND CURETTAGE, OPERATIVE HYSTEROSCOPY WITH MORCELLATOR    Diagnosis:pelvic pain, septate uterus, menorrhagia  Diagnosis Additional Information: pelvic pain, septate uterus, menorrhagia    Anesthesia Type:  General, ETT    Note:  Anesthesia Post Evaluation    Patient location during evaluation: PACU  Patient participation: Able to fully participate in evaluation  Level of consciousness: awake and alert  Pain management: adequate  Airway patency: patent  Cardiovascular status: acceptable  Respiratory status: acceptable  Hydration status: acceptable  PONV: controlled     Anesthetic complications: None          Last vitals:  Vitals:    03/06/19 1230 03/06/19 1235 03/06/19 1250   BP: 119/67 125/68 121/69   Pulse: 72 61 57   Resp: 14 12 15   Temp:  98.2  F (36.8  C)    SpO2: 99% 99% 98%         Electronically Signed By: Vipul Walls MD  March 6, 2019  1:14 PM

## 2019-03-06 NOTE — ANESTHESIA CARE TRANSFER NOTE
Patient: Kindra Gresham    Procedure(s):  robotic assisted laparoscopic endometriosis resection/fulguration, hysterscopic septum removal, dilation and curettage, chromtubation  COMBINED DILATION AND CURETTAGE, OPERATIVE HYSTEROSCOPY WITH MORCELLATOR    Diagnosis: pelvic pain, septate uterus, menorrhagia  Diagnosis Additional Information: No value filed.    Anesthesia Type:   General, ETT     Note:  Airway :Face Mask  Patient transferred to:PACU  Comments: To PACU, adequate gas exchange, report to RN.Handoff Report: Identifed the Patient, Identified the Reponsible Provider, Reviewed the pertinent medical history, Discussed the surgical course, Reviewed Intra-OP anesthesia mangement and issues during anesthesia, Set expectations for post-procedure period and Allowed opportunity for questions and acknowledgement of understanding      Vitals: (Last set prior to Anesthesia Care Transfer)    CRNA VITALS  3/6/2019 1152 - 3/6/2019 1223      3/6/2019             Resp Rate (observed):  2  (Abnormal)                 Electronically Signed By: MARIA GUADALUPE Burns CRNA  March 6, 2019  12:23 PM

## 2019-03-06 NOTE — BRIEF OP NOTE
[facility] Brief Operative Note    Pre-operative diagnosis: pelvic pain, septate uterus, menorrhagia   Post-operative diagnosis 25 y/o  wtih uterine septum and pelvic pain and pcos s/p robotic assisted laparoscopic endometriosis resection, chromotubation, hysteroscopic septum removal, dilation and curettage with polypectomy     Procedure: Procedure(s):  robotic assisted laparoscopic endometriosis resection/fulguration, hysterscopic septum removal, dilation and curettage, chromtubation  COMBINED DILATION AND CURETTAGE, OPERATIVE HYSTEROSCOPY WITH MORCELLATOR   Surgeon(s): Surgeon(s) and Role:  Panel 1:     * Stephanie Alicia DO - Primary  Panel 2:     * Stephanie Alicia DO - Primary   Estimated blood loss: 10 mL    Specimens: ID Type Source Tests Collected by Time Destination   A : Endometrial Curettings and Polyp Tissue Endometrium SURGICAL PATHOLOGY EXAM Stephanie Alicia,  3/6/2019 11:28 AM    B : Posterior [place] Tissue Peritoneum SURGICAL PATHOLOGY EXAM Stephanie Alicia,  3/6/2019 11:47 AM    C : Right Pelvic Sidewall Tissue Peritoneum SURGICAL PATHOLOGY EXAM Stephanie Alicia,  3/6/2019 11:50 AM    D : Right Pelvic Sidewall Tissue Peritoneum SURGICAL PATHOLOGY EXAM Stephanie Alicia,  3/6/2019 11:52 AM    DREVISED : Left  Pelvic Sidewall Tissue Peritoneum SURGICAL PATHOLOGY EXAM Stephanie Alicia,  3/6/2019 11:55 AM       Findings: Normal pelvis, normal ovaries, bilateral fallopian tubes NON-patent  Uterine septum, bilateral normal tubal ostia, endometrial polyps seen

## 2019-03-06 NOTE — ANESTHESIA PREPROCEDURE EVALUATION
Anesthesia Pre-Procedure Evaluation    Patient: Kindra Gresham   MRN: [id number] : 1995          Preoperative Diagnosis: pelvic pain, septate uterus, menorrhagia    Procedure(s):  robotic assisted laparoscopic endometriosis resection/fulguration, hysterscopic septum removal, dilation and curettage, chromtubation  COMBINED DILATION AND CURETTAGE, OPERATIVE HYSTEROSCOPY WITH MORCELLATOR    Past Medical History:   Diagnosis Date     Abnormal Pap smear of cervix 2018: See problem list.      S/P LEEP 10/11/2018    03/28/18: [facility]-H and LSIL pap (abstracted records) 18: [place] HSIL,CINDY 2 with LSIL also present ECC Neg for dysplasia (abstracted records) 18: LEEP CINDY 2 with LSIL also present, margins clear. (abstracted records) 18: NIL pap, + HR HPV (not 16 or 18) result. Plan [place] per provider.  19: [place] Neg for dysplasia. Plan cotest in 1 year.      Past Surgical History:   Procedure Laterality Date     LEEP TX, CERVICAL  2018: LEEP CINDY 2 with LSIL also present, margins clear. (abstracted records)     ORTHOPEDIC SURGERY Right 2017    Arthroscopy Right Knee     Anesthesia Evaluation     . Pt has had prior anesthetic. Type: General           ROS/MED HX    ENT/Pulmonary:  - neg pulmonary ROS     Neurologic:  - neg neurologic ROS     Cardiovascular:  - neg cardiovascular ROS       METS/Exercise Tolerance:     Hematologic:  - neg hematologic  ROS       Musculoskeletal:  - neg musculoskeletal ROS       GI/Hepatic:  - neg GI/hepatic ROS       Renal/Genitourinary:  - ROS Renal section negative       Endo: Comment: Endometriosis    (+) Other Endocrine Disorder  Abnormal Pap smear of cervix.      Psychiatric:  - neg psychiatric ROS       Infectious Disease:  - neg infectious disease ROS       Malignancy:      - no malignancy   Other:    (+) No chance of pregnancy C-spine cleared: N/A, no H/O Chronic Pain,no other significant disability                      "    Physical Exam  Normal systems: cardiovascular, pulmonary and dental    Airway   Mallampati: I  TM distance: >3 FB  Neck ROM: full    Dental     Cardiovascular       Pulmonary             Lab Results   Component Value Date    WBC 7.0 02/27/2019    HGB 13.9 02/27/2019    HCT 39.9 02/27/2019     02/27/2019    TSH 1.49 12/17/2018    HCG Negative 03/06/2019       Preop Vitals  BP Readings from Last 3 Encounters:   03/02/19 (!) 139/94   03/01/19 139/82   02/27/19 (!) 130/98    Pulse Readings from Last 3 Encounters:   03/02/19 89   03/01/19 107   02/27/19 74      Resp Readings from Last 3 Encounters:   02/27/19 16    SpO2 Readings from Last 3 Encounters:   03/02/19 98%   03/01/19 99%   02/27/19 99%      Temp Readings from Last 1 Encounters:   03/02/19 97.7  F (36.5  C) (Oral)    Ht Readings from Last 1 Encounters:   03/06/19 1.753 m (5' 9\")      Wt Readings from Last 1 Encounters:   03/06/19 77.6 kg (171 lb)    Estimated body mass index is 25.25 kg/m  as calculated from the following:    Height as of this encounter: 1.753 m (5' 9\").    Weight as of this encounter: 77.6 kg (171 lb).       Anesthesia Plan      History & Physical Review  History and physical reviewed and following examination; no interval change.    ASA Status:  1 .    NPO Status:  > 8 hours    Plan for General and ETT with Intravenous induction. Maintenance will be Balanced.    PONV prophylaxis:  Ondansetron (or other 5HT-3) and Dexamethasone or Solumedrol       Postoperative Care      Consents  Anesthetic plan, risks, benefits and alternatives discussed with:  Patient.  Use of blood products discussed: Yes.   Use of blood products discussed with Patient.  Consented to blood products.  .                 Vipul Walls MD                    .  "

## 2019-03-06 NOTE — DISCHARGE INSTRUCTIONS
Follow up in 1-2 weeks   Call [phone] or [phone] for appointment     Call and ask to be seen or talk to a doctor for the following (You can go to the ob triage button on answering service line [phone]):        Increased bleeding, fever, general unwell feeling or increased pain.   Please call for any reason or concern!     Dr. Stephanie Alicia, DO    OB/GYN   [facility]  Toradol (IV ibuprofen) given at 8:30 AM.  Please do not take any ibuprofen until 2:30PM today.    Tylenol 975mg given at 8:30 AM.  May take tylenol every 4-6 hours as needed for pain.    LAPAROSCOPY, HYSTEROSCOPY OR PELVISCOPY DISCHARGE INSTRUCTIONS    PLEASE RETURN TO THE CLINIC IN:  ____1 WEEK  ____2 WEEKS  ____4 WEEKS  ____6 WEEKS  MAKE THIS APPOINTMENT AFTER YOU GET HOME IF IT HAS NOT ALREADY BEEN SCHEDULED.    DO NOT DRIVE A CAR, DRINK ALCOHOL OR USE MACHINERY FOR THE NEXT 24 HOURS.  YOU SHOULD WAIT UNTIL YOU HAVE RECOVERED BEFORE MAKING ANY IMPORTANT DECISIONS.    PAIN  YOU MAY HAVE CRAMPS, SHOULDER PAIN OR A LOW BACKACHE FOR 24 TO 48 HOURS.  TYLENOL (ACETAMINOPHEN) OR MOTRIN (IBUPROFEN) MAY HELP, OR YOUR DOCTOR MAY GIVE YOU PAIN MEDICINE.  CALL YOUR DOCTOR IF PAIN CANNOT BE CONTROLLED.    BLEEDING OR VAGINAL DISCHARGE  YOU MAY HAVE SOME BLEEDING OR DISCHARGE FOR UP TO A WEEK OR LONGER.  DO NOT DOUCHE, USE TAMPONS OR HAVE SEX (INTERCOURSE) FOR ______DAYS.  CALL YOUR DOCTOR IF YOU SOAK MORE THAN ONE MAXI PAD (SANITARY [place]) PER HOUR, OR IF YOU PASS LARGE BLOOD CLOTS.    FEVER  YOU MAY HAVE A LOW FEVER FOR THE FIRST TWO DAYS.  CALL YOUR DOCTOR IF IT GOES OVER 101 DEGREES.    NAUSEA  IF YOU HAVE NAUSEA (FEEL SICK TO YOUR STOMACH), STAY IN BED.  TRY DRINKING A SMALL AMOUNT OF 7-UP, TEA OR SOUP.    SWOLLEN BELLY  IF YOUR ABDOMEN (BELLY AREA) FEELS FIRM OR SWOLLEN, CALL YOUR DOCTOR.    DIZZINESS AND WEAKNESS  YOU MAY FEEL DIZZY OR WEAK FOR A FEW DAYS.  IF SO, YOU SHOULD REST OFTEN, STAND UP  SLOWLY AND USE CARE WHEN CLIMBING STAIRS.    DIET AND ACTIVITY  EAT LIGHT MEALS AND DRINK PLENTY OF FLUIDS FOR THE FIRST 24 HOURS (OR LONGER, IF YOU HAVE NAUSEA).  WAIT 5 DAYS BEFORE BATHING.  SHOWERS ARE OKAY.  MOST WOMEN CAN RETURN TO WORK AFTER 24 HOURS.  YOU MAY GO BACK TO YOUR OTHER ACTIVITIES AFTER YOUR PAIN GOES AWAY.      IF YOU HAVE STITCHES  YOU MAY REMOVE YOUR BANDAGE THE DAY AFTER TREATMENT.  YOUR DOCTOR WILL TELL YOU IF YOUR STITCHES NEED TO BE REMOVED.  SOME STITCHES DISSOLVE OVER TIME.         DR. MARIZA DE LEÓN M.D.   CLINIC PHONE NUMBER:  [phone].    DILATION AND CURETTAGE  DISCHARGE INSTRUCTIONS    PLEASE RETURN TO THE CLINIC IN:  ____1 WEEK  ____2 WEEKS  ____4 WEEKS  ____6 WEEKS  MAKE THIS APPOINTMENT AFTER YOU GET HOME IF IT HAS NOT ALREADY BEEN SCHEDULED.    DO NOT DRIVE A CAR, DRINK ALCOHOL OR USE MACHINERY FOR THE NEXT 24 HOURS.  YOU SHOULD WAIT UNTIL YOU HAVE RECOVERED BEFORE MAKING ANY IMPORTANT DECISIONS.    PAIN AND DISCOMFORT  YOU MAY HAVE CRAMPS OR A LOW BACKACHE FOR 24 TO 48 HOURS.  TYLENOL (ACETAMINOPHEN) OR MOTRIN (IBUPROFEN) MAY HELP, OR YOUR DOCTOR MAY GIVE YOU PAIN MEDICINE.  CALL YOUR DOCTOR IF PAIN CANNOT BE CONTROLLED.  YOU MAY FEEL DROWSY AND WEAK FOR A DAY OR TWO.    VAGINAL DISCHARGE  YOU MAY HAVE SOME BLEEDING OR DISCHARGE FOR UP TO TWO WEEKS.  DO NOT DOUCHE, USE TAMPONS OR HAVE SEX (INTERCOURSE) IN THE FIRST WEEK.  CALL YOUR DOCTOR IF YOU SOAK MORE THAN ONE MAXI PAD (SANITARY [place]) PER HOUR, OR IF YOU PASS LARGE BLOOD CLOTS.    OTHER SYMPTOMS  YOU MAY HAVE A LOW FEVER FOR THE FIRST TWO DAYS.  CALL YOUR DOCTOR IF YOUR FEVER GOES OVER 101 DEGREES FAHRENHEIT.    IF YOU HAVE NAUSEA (FEEL SICK TO YOUR STOMACH), STAY IN BED.  TRY DRINKING A SMALL AMOUNT 7-UP, TEA OR SOUP.    DIET AND ACTIVITY  EAT LIGHT MEALS AND DRINK PLENTY OF FLUIDS FOR THE FIRST 24 HOURS (OR LONGER, IF YOU HAVE NAUSEA).    YOU MAY BATHE, SHOWER AND CLIMB STAIRS.  MOST WOMEN CAN RETURN TO WORK AFTER 24  HOURS.  YOU MAY GO BACK TO YOUR OTHER ACTIVITIES AFTER YOUR PAIN GOES AWAY.        GENERAL ANESTHESIA OR SEDATION ADULT DISCHARGE INSTRUCTIONS   SPECIAL PRECAUTIONS FOR 24 HOURS AFTER SURGERY    IT IS NOT UNUSUAL TO FEEL LIGHT-HEADED OR FAINT, UP TO 24 HOURS AFTER SURGERY OR WHILE TAKING PAIN MEDICATION.  IF YOU HAVE THESE SYMPTOMS; SIT FOR A FEW MINUTES BEFORE STANDING AND HAVE SOMEONE ASSIST YOU WHEN YOU GET UP TO WALK OR USE THE BATHROOM.    YOU SHOULD REST AND RELAX FOR THE NEXT 24 HOURS AND YOU MUST MAKE ARRANGEMENTS TO HAVE SOMEONE STAY WITH YOU FOR AT LEAST 24 HOURS AFTER YOUR DISCHARGE.  AVOID HAZARDOUS AND STRENUOUS ACTIVITIES.  DO NOT MAKE IMPORTANT DECISIONS FOR 24 HOURS.    DO NOT DRIVE ANY VEHICLE OR OPERATE MECHANICAL EQUIPMENT FOR 24 HOURS FOLLOWING THE END OF YOUR SURGERY.  EVEN THOUGH YOU MAY FEEL NORMAL, YOUR REACTIONS MAY BE AFFECTED BY THE MEDICATION YOU HAVE RECEIVED.    DO NOT DRINK ALCOHOLIC BEVERAGES FOR 24 HOURS FOLLOWING YOUR SURGERY.    DRINK CLEAR LIQUIDS (APPLE JUICE, GINGER ALE, 7-UP, BROTH, ETC.).  PROGRESS TO YOUR REGULAR DIET AS YOU FEEL ABLE.    YOU MAY HAVE A DRY MOUTH, A SORE THROAT, MUSCLES ACHES OR TROUBLE SLEEPING.  THESE SHOULD GO AWAY AFTER 24 HOURS.    CALL YOUR DOCTOR FOR ANY OF THE FOLLOWING:  SIGNS OF INFECTION (FEVER, GROWING TENDERNESS AT THE SURGERY SITE, A LARGE AMOUNT OF DRAINAGE OR BLEEDING, SEVERE PAIN, FOUL-SMELLING DRAINAGE, REDNESS OR SWELLING.    IT HAS BEEN OVER 8 TO 10 HOURS SINCE SURGERY AND YOU ARE STILL NOT ABLE TO URINATE (PASS WATER).

## 2019-03-07 LAB — COPATH REPORT: NORMAL

## 2019-03-07 NOTE — OP NOTE
PREOPERATIVE DIAGNOSIS: 25 y/o  with pelvic pain, uterine septum.    POSTOPERATIVE DIAGNOSIS:25 y/o  with pelvic pain, uterine septum    Surgeon:  Dr. Alicia   Assistant: Katelynn Duffy SA    PROCEDURES:   1. Hysteroscopy, dilation and curettage   2. Uterine septum removal and endometrial polypectomy  3. Robotic assisted laparoscopic endometriosis resection.   4. Chromotubation    INDICATIONS:25 y/o  with pelvic pain, uterine septum diagnosed via MRI.   I counseled her on risks, benefits, alternatives of diagnosis.     FINDINGS:   Laparoscopy: normal pelvis, normal uterine shape laparoscopically, normal ovaries,normal bilateral fallopian tubes with bilateral fallopian tubes NON-patent  Uterine septum, bilateral normal tubal ostia, endometrial polyps seen    PROCEDURE: After informed consent was obtained, the patient was taken to the operating room where she was placed in dorsal supine position and placed under general anesthesia without difficulty. Exam under anesthesia reveals the findings above. The patient was prepped and draped in normal sterile fashion. The bladder is drained via straight catheterization.  The bivalve speculum was placed in the patient's vaginal vault. Anterior lip of the cervix was grasped with a single-tooth tenaculum. The cervix was dilated up to 7 mm and the hysteroscope was placed into the intrauterine cavity and there was noted to be the findings above. Curettage and endometrial polypectomy is performed with myosure device.  The septum is reduced with sharp dissection with hysteroscopic scissors until friable edge is seen.  The hysteroscope is removed and the uterine manipulator is placed. Attention then turned to the patient's abdomen where [place] clamps are placed in the umbilicus and a Veress needle was entered into the patient's abdomen. Saline flowed easily through this and at this time the syringe is removed and the gas was hooked up and 3 liters of CO2  was insufflated.  An 8 mm transverse umbilical incision was made and a 8 mm port was placed through this and the laparoscope confirmed intra-abdominal placement. Attention is then turned towards the patient's abdomen where we measured 10 cm lateral and down on the right and 10 cm latera on the left side and 8 mm incisions are made after peritoneal mapping was performed and the robotic trocar and port are placed.   An 8 mm incision is made 8 cm lateral and 3 cm superior to the midline port and a 8 mm air-port is placed.  The da [place]id[place] was then docked and I turned my attention towards the pelvic area.  Chromotubation is done and the fallopian tubes are non-patent and the uterus became rounded apparently full with fluid. Ureters are identified bilaterally and peristalsis is seen before and after resection.  The pelvic is visually without endometriosis. Right and left pelvic sidewall biopsies are performed to rule out microscopic endometriosis.   Hemostasis is seen.  Irrigation is performed.  Seprafilm slurry was placed in the patient's abdomen. The robot was then undocked.  The rocars were removed. The skin was closed with 4-0 Monocryl in a subcuticular fashion. All port sites were closed and Dermabond was placed over the incisions. Turning attention towards the vaginal area, the Bach catheter was removed. The uterine manipulator is removed. The cervix is inspected using visualization with the bivalve speculum and is found to be hemostatic. The patient tolerated the procedure well. Sponge, lap and needle counts were correct x2. The patient was taken out of the dorsal lithotomy position, placed in dorsal supine position, awakened from anesthesia and taken to recovery room in stable condition. No complications were apparent at time of procedure.      MARIZA DE LEÓN DO

## 2019-03-14 ENCOUNTER — OFFICE VISIT (OUTPATIENT)
Dept: OBGYN | Facility: CLINIC | Age: 24
End: 2019-03-14
Payer: OTHER GOVERNMENT

## 2019-03-14 VITALS — BODY MASS INDEX: 24.66 KG/M2 | WEIGHT: 167 LBS | SYSTOLIC BLOOD PRESSURE: 136 MMHG | DIASTOLIC BLOOD PRESSURE: 80 MMHG

## 2019-03-14 DIAGNOSIS — R10.2 PELVIC PAIN IN FEMALE: ICD-10-CM

## 2019-03-14 DIAGNOSIS — R42 DIZZINESS: ICD-10-CM

## 2019-03-14 DIAGNOSIS — Z82.49 FAMILY HISTORY OF PULMONARY EMBOLISM: ICD-10-CM

## 2019-03-14 DIAGNOSIS — D68.51 FACTOR V LEIDEN (H): ICD-10-CM

## 2019-03-14 DIAGNOSIS — R10.2 VULVAR PAIN: Primary | ICD-10-CM

## 2019-03-14 LAB
APTT PPP: 28 SEC (ref 22–37)
ERYTHROCYTE [DISTWIDTH] IN BLOOD BY AUTOMATED COUNT: 12.1 % (ref 10–15)
HCT VFR BLD AUTO: 40 % (ref 35–47)
HGB BLD-MCNC: 13.5 G/DL (ref 11.7–15.7)
INR PPP: 1.11 (ref 0.86–1.14)
MCH RBC QN AUTO: 30.6 PG (ref 26.5–33)
MCHC RBC AUTO-ENTMCNC: 33.8 G/DL (ref 31.5–36.5)
MCV RBC AUTO: 91 FL (ref 78–100)
PLATELET # BLD AUTO: 287 10E9/L (ref 150–450)
RBC # BLD AUTO: 4.41 10E12/L (ref 3.8–5.2)
WBC # BLD AUTO: 8.4 10E9/L (ref 4–11)

## 2019-03-14 PROCEDURE — 83090 ASSAY OF HOMOCYSTEINE: CPT | Performed by: FAMILY MEDICINE

## 2019-03-14 PROCEDURE — 81240 F2 GENE: CPT | Performed by: FAMILY MEDICINE

## 2019-03-14 PROCEDURE — 85303 CLOT INHIBIT PROT C ACTIVITY: CPT | Performed by: FAMILY MEDICINE

## 2019-03-14 PROCEDURE — 99213 OFFICE O/P EST LOW 20 MIN: CPT | Mod: 24 | Performed by: FAMILY MEDICINE

## 2019-03-14 PROCEDURE — 99024 POSTOP FOLLOW-UP VISIT: CPT | Performed by: FAMILY MEDICINE

## 2019-03-14 PROCEDURE — 86147 CARDIOLIPIN ANTIBODY EA IG: CPT | Performed by: FAMILY MEDICINE

## 2019-03-14 PROCEDURE — 85240 CLOT FACTOR VIII AHG 1 STAGE: CPT | Performed by: FAMILY MEDICINE

## 2019-03-14 PROCEDURE — 81241 F5 GENE: CPT | Performed by: FAMILY MEDICINE

## 2019-03-14 PROCEDURE — 36415 COLL VENOUS BLD VENIPUNCTURE: CPT | Performed by: FAMILY MEDICINE

## 2019-03-14 PROCEDURE — 85300 ANTITHROMBIN III ACTIVITY: CPT | Performed by: FAMILY MEDICINE

## 2019-03-14 PROCEDURE — 86146 BETA-2 GLYCOPROTEIN ANTIBODY: CPT | Performed by: FAMILY MEDICINE

## 2019-03-14 PROCEDURE — 85027 COMPLETE CBC AUTOMATED: CPT | Performed by: FAMILY MEDICINE

## 2019-03-14 PROCEDURE — 85306 CLOT INHIBIT PROT S FREE: CPT | Performed by: FAMILY MEDICINE

## 2019-03-14 PROCEDURE — 85613 RUSSELL VIPER VENOM DILUTED: CPT | Performed by: FAMILY MEDICINE

## 2019-03-14 PROCEDURE — 85670 THROMBIN TIME PLASMA: CPT | Performed by: FAMILY MEDICINE

## 2019-03-14 PROCEDURE — 85610 PROTHROMBIN TIME: CPT | Performed by: FAMILY MEDICINE

## 2019-03-14 PROCEDURE — 85730 THROMBOPLASTIN TIME PARTIAL: CPT | Performed by: FAMILY MEDICINE

## 2019-03-14 PROCEDURE — 81291 MTHFR GENE: CPT | Performed by: FAMILY MEDICINE

## 2019-03-14 PROCEDURE — 85307 ASSAY ACTIVATED PROTEIN C: CPT | Performed by: FAMILY MEDICINE

## 2019-03-14 RX ORDER — METRONIDAZOLE 500 MG/1
500 TABLET ORAL 2 TIMES DAILY
Qty: 14 TABLET | Refills: 0 | Status: SHIPPED | OUTPATIENT
Start: 2019-03-14 | End: 2019-04-18

## 2019-03-14 NOTE — NURSING NOTE
"Chief Complaint   Patient presents with     Surgical Followup     3/6 dizzy- cough-sore left side- bumos on outside of vaginal area       Initial /80   Wt 75.8 kg (167 lb)   LMP 2019   BMI 24.66 kg/m   Estimated body mass index is 24.66 kg/m  as calculated from the following:    Height as of 3/6/19: 1.753 m (5' 9\").    Weight as of this encounter: 75.8 kg (167 lb).  BP completed using cuff size: regular    Questioned patient about current smoking habits.  Pt. has never smoked.          The following HM Due: NONE      The following patient reported/Care Every where data was sent to:  P ABSTRACT QUALITY INITIATIVES [[id number]]        Skyla Saleem CMA                "

## 2019-03-14 NOTE — PROGRESS NOTES
"Subjective: Kindra is a 24 year old  female   status post robotic assisted laparoscopic endometriosis resection, chromotubation, hysteroscopic septum removal, dilation and curettage with polypectomy on 2019, here for incision check.  Doing well, denies fever, significant pain.   Is not taking pain medications.   States some light vaginal bleeding.          - Pt reports 3 \"bumps\" on her vulvar, two on the right side & one on the bottom left. She states the lesions are very swollen & sore in the morning [unable to touch it], but this decreases by the afternoon. Pt does shave her vulva, the last time this morning, but states the bumps were present before then -- present on .    > Declines noticing any open sores, but thinks she may have \"nicked\" one of  the bumps this morning   > Declines vaginal discharge, is still noticing light vaginal bleeding    - Pt also reports dizziness, fatigue & a slight cough. She states she is only able to be out doing stuff for a few hours & then needs a nap. Pt declines any recent fevers.     - Pt would also like a thrombophilia work-up completed today, as her father passed away from a blood clot & she would like to know if there are any genetic risks for her.         This document serves as a record of the services and decisions personally performed and made by Stephanie Alicia DO. It was created on her behalf by Yajaira Sarmiento, a trained medical scribe. The creation of this document is based the provider's statements to the medical scribe.  Scribe Yajaira Sarmiento 1:41 PM, 2019    Objective:  EXAM:  /80   Wt 75.8 kg (167 lb)   LMP 2019   BMI 24.66 kg/m    Constitutional: healthy, alert and no distress  Gastrointestinal: Abdomen soft, non-tender. Incision intact, no erthema, drainage.  GYN PELVIC: 1 cm x 3 mm bump @ 0900 of right labia majora; Otherwise NEGATIVE, normal external genitalia,  normal vaginal mucosa, normal cervix and normal uterus, adnexa, " no masses or tenderness          Assessment/Plan: 24 year old female status post robotic assisted laparoscopic endometriosis resection, chromotubation, hysteroscopic septum removal, dilation and curettage with polypectomy on 03/06/2019.  V67.00C Surgery Follow-Up Examination  (primary encounter diagnosis)    Plan:   1. Abdominal incisions healing well, advised to continue monitoring for pain or bleeding & report any occurrence  2. Unknown bump/lesions: Flagyl prescribed to treat possible vulvar infection   - Advised against shaving until symptoms resolve  3. Informed that the chromotubation showed tubal blockage, advised to schedule a hysterosalpingogram to confirm complete blockage   - Order placed, recommended to wait 4 weeks befor undergoing   - Informed if they are completely blocked, may refer to infertility specialist to  discuss IVF [if desired]   - May still start taking prenatals & begin trying naturally  4. Dizziness/Fatigue: CBC pending; Advised to complete antibiotics, may also be an extended side effect from the general anesthesia or taking narcotics  5. FMHx of blood clot: Lab work-up pending  6. Return to clinic in 1 week for follow-up; otherwise as needed      Rx:  - Flagyl 500 mg 1 tab po BID for 7 days        The information in this document, created by the medical scribe for me, accurately reflects the services I personally performed and the decisions made by me. I have reviewed and approved this document for accuracy prior to leaving the patient care area.  1:41 PM, 03/14/19    Dr. Stephanie Alicia, DO    OB/GYN   [facility]

## 2019-03-14 NOTE — PATIENT INSTRUCTIONS
Return as needed     Start prenatals     Call radiology -[phone]      Dr. Stephanie Alicia, DO    Obstetrics and Gynecology  [facility] [place] [facility] [place]

## 2019-03-15 LAB
APCR PPP: 1.9 {RATIO}
AT III ACT/NOR PPP CHRO: 86 % (ref 85–135)
B2 GLYCOPROT1 IGG SERPL IA-ACNC: 1 U/ML
B2 GLYCOPROT1 IGM SERPL IA-ACNC: 1 U/ML
CARDIOLIPIN ANTIBODY IGG: <1.6 GPL-U/ML (ref 0–19.9)
CARDIOLIPIN ANTIBODY IGM: 0.2 MPL-U/ML (ref 0–19.9)
FACT VIII ACT/NOR PPP: 157 % (ref 55–200)
PROT C ACT/NOR PPP CHRO: 107 % (ref 70–170)
PROT S FREE AG ACT/NOR PPP IA: 90 % (ref 55–125)
THROMBIN TIME: 16.2 SEC (ref 13–19)

## 2019-03-18 LAB
COPATH REPORT: NORMAL
LA PPP-IMP: NEGATIVE

## 2019-03-20 LAB — HCYS SERPL-SCNC: 8.2 UMOL/L (ref 4–12)

## 2019-03-21 ENCOUNTER — TELEPHONE (OUTPATIENT)
Dept: FAMILY MEDICINE | Facility: CLINIC | Age: 24
End: 2019-03-21

## 2019-03-21 DIAGNOSIS — D68.51 FACTOR V LEIDEN CARRIER (H): Primary | ICD-10-CM

## 2019-03-21 LAB — COPATH REPORT: NORMAL

## 2019-03-21 NOTE — TELEPHONE ENCOUNTER
Per patient's insurance referral has to come from primary provider vs OBGYN.    Can you place?    Your provider has referred you to: [facility]: [facility] [phone]   [email or web address]    Please be aware that coverage of these services is subject to the terms and limitations of your health insurance plan.  Call member services at your health plan with any benefit or coverage questions.      Please bring the following with you to your appointment:    (1) Any X-Rays, CTs or MRIs which have been performed.  Contact the facility where they were done to arrange for  prior to your scheduled appointment.   (2) List of current medications  (3) This referral request   (4) Any documents/labs given to you for this referral          Associated Diagnoses     Family history of pulmonary embolism [Z82.49]       Factor V Leiden (H) [D68.51]         Mimi Patterson

## 2019-03-22 ENCOUNTER — TELEPHONE (OUTPATIENT)
Dept: ONCOLOGY | Facility: CLINIC | Age: 24
End: 2019-03-22

## 2019-03-22 NOTE — TELEPHONE ENCOUNTER
ONCOLOGY INTAKE: Records Information      APPT INFORMATION:  Referring provider:  Drea Chau  Referring provider s clinic:  [facility]  Reason for visit/diagnosis:  Factor V    Were the records received with the referral (via [employer])? No - Internal Referral    Has patient been seen for any external appt for this diagnosis (enter clinic/location)? Per PT, No outside records    ADDITIONAL INFORMATION:  NA

## 2019-04-12 NOTE — TELEPHONE ENCOUNTER
RECORDS STATUS - ALL OTHER DIAGNOSIS      RECORDS RECEIVED FROM: Internal   DATE RECEIVED: 4.18.19   NOTES STATUS DETAILS   OFFICE NOTE from referring provider Complete 3.21.19 Dr. Chau   OFFICE NOTE from medical oncologist N/A    DISCHARGE SUMMARY from hospital N/A    DISCHARGE REPORT from the ER N/A    OPERATIVE REPORT N/A    MEDICATION LIST Complete [facility]   CLINICAL TRIAL TREATMENTS TO DATE N/A    LABS     PATHOLOGY REPORTS N/A    ANYTHING RELATED TO DIAGNOSIS Complete Labs in [facility]   GENONOMIC TESTING     TYPE: N/A    IMAGING (NEED IMAGES & REPORT)     CT SCANS N/A    MRI N/A    MAMMO N/A    ULTRASOUND N/A    PET N/A

## 2019-04-18 ENCOUNTER — PRE VISIT (OUTPATIENT)
Dept: ONCOLOGY | Facility: CLINIC | Age: 24
End: 2019-04-18

## 2019-04-18 ENCOUNTER — ONCOLOGY VISIT (OUTPATIENT)
Dept: ONCOLOGY | Facility: CLINIC | Age: 24
End: 2019-04-18
Attending: FAMILY MEDICINE
Payer: OTHER GOVERNMENT

## 2019-04-18 ENCOUNTER — HOSPITAL ENCOUNTER (OUTPATIENT)
Facility: CLINIC | Age: 24
Setting detail: SPECIMEN
End: 2019-04-18
Attending: FAMILY MEDICINE
Payer: OTHER GOVERNMENT

## 2019-04-18 VITALS
DIASTOLIC BLOOD PRESSURE: 91 MMHG | WEIGHT: 171.6 LBS | RESPIRATION RATE: 18 BRPM | TEMPERATURE: 98.5 F | OXYGEN SATURATION: 99 % | HEIGHT: 69 IN | HEART RATE: 71 BPM | BODY MASS INDEX: 25.42 KG/M2 | SYSTOLIC BLOOD PRESSURE: 146 MMHG

## 2019-04-18 DIAGNOSIS — D68.51 FACTOR V LEIDEN CARRIER (H): Primary | ICD-10-CM

## 2019-04-18 PROCEDURE — 99203 OFFICE O/P NEW LOW 30 MIN: CPT | Performed by: INTERNAL MEDICINE

## 2019-04-18 PROCEDURE — G0463 HOSPITAL OUTPT CLINIC VISIT: HCPCS

## 2019-04-18 ASSESSMENT — MIFFLIN-ST. JEOR: SCORE: 1597.75

## 2019-04-18 ASSESSMENT — PAIN SCALES - GENERAL: PAINLEVEL: NO PAIN (0)

## 2019-04-18 NOTE — LETTER
2019         RE: Kindra Gresham  [address] [place] [id number]        Dear Colleague,    Thank you for referring your patient, Kindra Gresham, to the [facility]. Please see a copy of my visit note below.    [facility]    Hematology/Oncology New Patient Note      Today's Date: 19    Reason for Consult: factor V Leiden heterozygosity      HISTORY OF PRESENT ILLNESS: Kindra Gresham is a 24 year old female who presents with factor V Leiden heterozygosity.  Kindra says that her father  of pulmonary embolism.  Her paternal grandmother had 2 blood clots, and at least one was in her leg.  She denies any other family history of blood clots.  She does not have personal history of blood clots.  She has 11 siblings, and none have had blood clots.  She has has history of laparoscopic endometriosis resection, chromotubation, hysteroscopic septum removal, dilation and curettage with polypectomy on 3/6/19.  She also has had history of laparoscopic knee surgery for torn cartilage.  She denies any bleeding or clotting complications with those surgeries.      She does not smoke.  She drinks alcohol occasionally.  She took oral contraceptives for a month, but has stopped it.         REVIEW OF SYSTEMS:   14 point ROS was reviewed and is negative other than as noted above in HPI.       HOME MEDICATIONS:  Current Outpatient Medications   Medication Sig Dispense Refill     melatonin 5 MG CAPS Take 5 mg by mouth daily       Prenatal Vit-Fe Fumarate-FA (PRENATAL VITAMIN PO) Take 1 tablet by mouth       cyclobenzaprine (FLEXERIL) 5 MG tablet Take at night PRN (Patient not taking: Reported on 2019) 30 tablet 1     diclofenac (CATAFLAM) 50 MG tablet Take 1 tablet (50 mg) by mouth 3 times daily (Patient not taking: Reported on 2019) 270 tablet 3     ibuprofen (ADVIL/MOTRIN) 800 MG tablet Take 1 tablet (800 mg) by  mouth every 8 hours as needed for moderate pain (Patient not taking: Reported on 4/18/2019) 60 tablet 0     norgestimate-ethinyl estradiol (ORTHO-CYCLEN/SPRINTEC) 0.25-35 MG-MCG tablet Take 1 tablet by mouth daily (Patient not taking: Reported on 4/18/2019) 84 tablet 4         ALLERGIES:  No Known Allergies      PAST MEDICAL HISTORY:  Past Medical History:   Diagnosis Date     Abnormal Pap smear of cervix 03/28/2018 03/28/18: See problem list.      S/P LEEP 10/11/2018    03/28/18: ASC-H and LSIL pap (abstracted records) 03/28/18: [place] HSIL,CINDY 2 with LSIL also present ECC Neg for dysplasia (abstracted records) 04/20/18: LEEP CINDY 2 with LSIL also present, margins clear. (abstracted records) 12/03/18: NIL pap, + HR HPV (not 16 or 18) result. Plan [place] per provider.  01/22/19: [place] Neg for dysplasia. Plan cotest in 1 year.          PAST SURGICAL HISTORY:  Past Surgical History:   Procedure Laterality Date     DAVINCI PELVIC PROCEDURE N/A 3/6/2019    Procedure: robotic assisted laparoscopic endometriosis resection/fulguration, hysterscopic septum removal, dilation and curettage, chromotubation;  Surgeon: Stephanie Alicia DO;  Location: [facility] OR     DILATION AND CURETTAGE, OPERATIVE HYSTEROSCOPY WITH MORCELLATOR, COMBINED N/A 3/6/2019    Procedure: COMBINED DILATION AND CURETTAGE, OPERATIVE HYSTEROSCOPY WITH MORCELLATOR;  Surgeon: Stephanie Alicia DO;  Location: [facility] OR     LEEP TX, CERVICAL  04/20/2018 04/20/18: LEEP CINDY 2 with LSIL also present, margins clear. (abstracted records)     ORTHOPEDIC SURGERY Right 2017    Arthroscopy Right Knee         SOCIAL HISTORY:  Social History     Socioeconomic History     Marital status:      Spouse name: Not on file     Number of children: Not on file     Years of education: Not on file     Highest education level: Not on file   Occupational History     Not on file   Social Needs     Financial resource strain: Not on file     Food insecurity:     Worry: Not on  "file     Inability: Not on file     Transportation needs:     Medical: Not on file     Non-medical: Not on file   Tobacco Use     Smoking status: Never Smoker     Smokeless tobacco: Never Used   Substance and Sexual Activity     Alcohol use: Yes     Comment: Occasional     Drug use: No     Sexual activity: Yes     Partners: Male   Lifestyle     Physical activity:     Days per week: Not on file     Minutes per session: Not on file     Stress: Not on file   Relationships     Social connections:     Talks on phone: Not on file     Gets together: Not on file     Attends [employer] service: Not on file     Active member of club or organization: Not on file     Attends meetings of clubs or organizations: Not on file     Relationship status: Not on file     Intimate partner violence:     Fear of current or ex partner: Not on file     Emotionally abused: Not on file     Physically abused: Not on file     Forced sexual activity: Not on file   Other Topics Concern     Parent/sibling w/ CABG, MI or angioplasty before 65F 55M? Not Asked   Social History Narrative     Not on file         FAMILY HISTORY:  Family History   Problem Relation Age of Onset     Pulmonary Embolism Father         Dx-57     Myocardial Infarction Maternal Grandfather         Dx-68     Endometriosis Paternal Grandmother      Deep Vein Thrombosis Paternal Grandmother          PHYSICAL EXAM:  Vital signs:  BP (!) 146/91   Pulse 71   Temp 98.5  F (36.9  C) (Oral)   Resp 18   Ht 1.753 m (5' 9\")   Wt 77.8 kg (171 lb 9.6 oz)   SpO2 99%   BMI 25.34 kg/m      ECO  GENERAL/CONSTITUTIONAL: No acute distress.  EYES: No scleral icterus.  MUSCULOSKELETAL: Warm and well-perfused, no cyanosis, clubbing, or edema.  NEUROLOGIC: Alert, oriented, answers questions appropriately.  INTEGUMENTARY: No jaundice.      LABS:  3/14/19:  RESULTS:     Factor V 1691G>A (Leiden)  RESULTS:   Mutation analyzed:     1691G>A   Factor V 1691G>A (Leiden)  Interpretation:      PRESENT "   Factor V 1691G>A (Leiden) mutation  genotype:      G/A     FACTOR 2/PROTHROMBIN RESULTS:   Mutation analyzed:     20745I>A   Factor 2 Mutation Interpretation:      ABSENT   Factor 2 Mutation genotype:      G/G     INTERPRETATION:   The patient is a heterozygote (one copy of the gene positive) for the   Factor V 1691G>A (Leiden) mutation.   The patient is negative for the Factor 2 mutation.    Component      Latest Ref Rng & Units 3/14/2019   Thrombin Time      13.0 - 19.0 sec 16.2   PTT      22 - 37 sec 28   INR      0.86 - 1.14 1.11     Component      Latest Ref Rng & Units 3/14/2019   Cardiolipin Antibody IgG      0.0 - 19.9 GPL-U/mL <1.6   Cardiolipin Antibody IgM      0.0 - 19.9 MPL-U/mL 0.2   Copath Report       Patient Name: MELISA GRESHAM . . .   Factor 8 Assay      55 - 200 % 157   Homocysteine umol/L      4.0 - 12.0 umol/L 8.2   Beta 2 Glycoprotein 1 Antibody IgM      <7 U/mL 1.0   Beta 2 Glycoprotein 1 Antibody IgG      <7 U/mL 1.0   Lupus Result      NEG:Negative Negative   Protein S Free      55 - 125 % 90   Prot C Chromogenic      70 - 170 % 107   Antithrombin III Chromogenic      85 - 135 % 86   Activated Prot C Resistance Ratio      >2.12 1.90 (L)     CBC RESULTS:   Recent Labs   Lab Test 03/14/19  1421   WBC 8.4   RBC 4.41   HGB 13.5   HCT 40.0   MCV 91   MCH 30.6   MCHC 33.8   RDW 12.1            ASSESSMENT/PLAN:  Melisa Gresham is a 24 year old female with:    1) Factor V Leiden heterozygosity: Patient does not have personal history of thrombosis, although she has family history.  Although her risk of thrombosis is slightly higher, we discussed that patients with FVL heterozygosity is typically treated similarly to the general population.  Prophylactic anticoagulation is not indicated.  If she undergoes surgery, she would undergo the usual routine thromboprophylaxis.  We discussed that she should not take hormone based contraceptives.  She can use non-hormone  contraceptives.  She does not smoke and should not start.  If she travels, she should be walking around and stretching her legs every couple of hours.  Patient expressed understanding.  She can follow-up in our clinic as needed.        I spent a total of 30 minutes with the patient, with over >50% of the time in counseling and/or coordination of care.       Lorri Alexandre MD  Hematology/Oncology  [facility]      Again, thank you for allowing me to participate in the care of your patient.        Sincerely,        Lorri Alexandre MD

## 2019-04-18 NOTE — NURSING NOTE
"Oncology Rooming Note    April 18, 2019 10:43 AM   Kindra Gresham is a 24 year old female who presents for:    Chief Complaint   Patient presents with     Oncology Clinic Visit     New Patient Consult     Initial Vitals: BP (!) 146/91   Pulse 71   Temp 98.5  F (36.9  C) (Oral)   Resp 18   Ht 1.753 m (5' 9\")   Wt 77.8 kg (171 lb 9.6 oz)   SpO2 99%   BMI 25.34 kg/m   Estimated body mass index is 25.34 kg/m  as calculated from the following:    Height as of this encounter: 1.753 m (5' 9\").    Weight as of this encounter: 77.8 kg (171 lb 9.6 oz). Body surface area is 1.95 meters squared.  No Pain (0) Comment: Data Unavailable   No LMP recorded.  Allergies reviewed: Yes  Medications reviewed: Yes    Medications: Medication refills not needed today.  Pharmacy name entered into [facility]: [facility] [address] [place] [address] AT [place] [id number]    Clinical concerns: yogesh Jeffrey, OMI              "

## 2019-04-18 NOTE — PROGRESS NOTES
[facility]    Hematology/Oncology New Patient Note      Today's Date: 19    Reason for Consult: factor V Leiden heterozygosity      HISTORY OF PRESENT ILLNESS: Kindra Gresham is a 24 year old female who presents with factor V Leiden heterozygosity.  Kindra says that her father  of pulmonary embolism.  Her paternal grandmother had 2 blood clots, and at least one was in her leg.  She denies any other family history of blood clots.  She does not have personal history of blood clots.  She has 11 siblings, and none have had blood clots.  She has has history of laparoscopic endometriosis resection, chromotubation, hysteroscopic septum removal, dilation and curettage with polypectomy on 3/6/19.  She also has had history of laparoscopic knee surgery for torn cartilage.  She denies any bleeding or clotting complications with those surgeries.      She does not smoke.  She drinks alcohol occasionally.  She took oral contraceptives for a month, but has stopped it.         REVIEW OF SYSTEMS:   14 point ROS was reviewed and is negative other than as noted above in HPI.       HOME MEDICATIONS:  Current Outpatient Medications   Medication Sig Dispense Refill     melatonin 5 MG CAPS Take 5 mg by mouth daily       Prenatal Vit-Fe Fumarate-FA (PRENATAL VITAMIN PO) Take 1 tablet by mouth       cyclobenzaprine (FLEXERIL) 5 MG tablet Take at night PRN (Patient not taking: Reported on 2019) 30 tablet 1     diclofenac (CATAFLAM) 50 MG tablet Take 1 tablet (50 mg) by mouth 3 times daily (Patient not taking: Reported on 2019) 270 tablet 3     ibuprofen (ADVIL/MOTRIN) 800 MG tablet Take 1 tablet (800 mg) by mouth every 8 hours as needed for moderate pain (Patient not taking: Reported on 2019) 60 tablet 0     norgestimate-ethinyl estradiol (ORTHO-CYCLEN/SPRINTEC) 0.25-35 MG-MCG tablet Take 1 tablet by mouth daily (Patient not taking: Reported on 2019) 84 tablet 4          ALLERGIES:  No Known Allergies      PAST MEDICAL HISTORY:  Past Medical History:   Diagnosis Date     Abnormal Pap smear of cervix 03/28/2018 03/28/18: See problem list.      S/P LEEP 10/11/2018    03/28/18: ASC-H and LSIL pap (abstracted records) 03/28/18: [place] HSIL,CINDY 2 with LSIL also present ECC Neg for dysplasia (abstracted records) 04/20/18: LEEP CINDY 2 with LSIL also present, margins clear. (abstracted records) 12/03/18: NIL pap, + HR HPV (not 16 or 18) result. Plan [place] per provider.  01/22/19: [place] Neg for dysplasia. Plan cotest in 1 year.          PAST SURGICAL HISTORY:  Past Surgical History:   Procedure Laterality Date     DAVINCI PELVIC PROCEDURE N/A 3/6/2019    Procedure: robotic assisted laparoscopic endometriosis resection/fulguration, hysterscopic septum removal, dilation and curettage, chromotubation;  Surgeon: Stehpanie Alicia DO;  Location: [facility] OR     DILATION AND CURETTAGE, OPERATIVE HYSTEROSCOPY WITH MORCELLATOR, COMBINED N/A 3/6/2019    Procedure: COMBINED DILATION AND CURETTAGE, OPERATIVE HYSTEROSCOPY WITH MORCELLATOR;  Surgeon: Stephanie Alicia DO;  Location: [facility] OR     LEEP TX, CERVICAL  04/20/2018 04/20/18: LEEP CINDY 2 with LSIL also present, margins clear. (abstracted records)     ORTHOPEDIC SURGERY Right 2017    Arthroscopy Right Knee         SOCIAL HISTORY:  Social History     Socioeconomic History     Marital status:      Spouse name: Not on file     Number of children: Not on file     Years of education: Not on file     Highest education level: Not on file   Occupational History     Not on file   Social Needs     Financial resource strain: Not on file     Food insecurity:     Worry: Not on file     Inability: Not on file     Transportation needs:     Medical: Not on file     Non-medical: Not on file   Tobacco Use     Smoking status: Never Smoker     Smokeless tobacco: Never Used   Substance and Sexual Activity     Alcohol use: Yes     Comment: Occasional      "Drug use: No     Sexual activity: Yes     Partners: Male   Lifestyle     Physical activity:     Days per week: Not on file     Minutes per session: Not on file     Stress: Not on file   Relationships     Social connections:     Talks on phone: Not on file     Gets together: Not on file     Attends Judaism service: Not on file     Active member of club or organization: Not on file     Attends meetings of clubs or organizations: Not on file     Relationship status: Not on file     Intimate partner violence:     Fear of current or ex partner: Not on file     Emotionally abused: Not on file     Physically abused: Not on file     Forced sexual activity: Not on file   Other Topics Concern     Parent/sibling w/ CABG, MI or angioplasty before 65F 55M? Not Asked   Social History Narrative     Not on file         FAMILY HISTORY:  Family History   Problem Relation Age of Onset     Pulmonary Embolism Father         Dx-57     Myocardial Infarction Maternal Grandfather         Dx-68     Endometriosis Paternal Grandmother      Deep Vein Thrombosis Paternal Grandmother          PHYSICAL EXAM:  Vital signs:  BP (!) 146/91   Pulse 71   Temp 98.5  F (36.9  C) (Oral)   Resp 18   Ht 1.753 m (5' 9\")   Wt 77.8 kg (171 lb 9.6 oz)   SpO2 99%   BMI 25.34 kg/m     ECO  GENERAL/CONSTITUTIONAL: No acute distress.  EYES: No scleral icterus.  MUSCULOSKELETAL: Warm and well-perfused, no cyanosis, clubbing, or edema.  NEUROLOGIC: Alert, oriented, answers questions appropriately.  INTEGUMENTARY: No jaundice.      LABS:  3/14/19:  RESULTS:     Factor V 1691G>A (Leiden)  RESULTS:   Mutation analyzed:     1691G>A   Factor V 1691G>A (Leiden)  Interpretation:      PRESENT   Factor V 1691G>A (Leiden) mutation  genotype:      G/A     FACTOR 2/PROTHROMBIN RESULTS:   Mutation analyzed:     17751M>A   Factor 2 Mutation Interpretation:      ABSENT   Factor 2 Mutation genotype:      G/G     INTERPRETATION:   The patient is a heterozygote (one copy " of the gene positive) for the   Factor V 1691G>A (Leiden) mutation.   The patient is negative for the Factor 2 mutation.    Component      Latest Ref Rng & Units 3/14/2019   Thrombin Time      13.0 - 19.0 sec 16.2   PTT      22 - 37 sec 28   INR      0.86 - 1.14 1.11     Component      Latest Ref Rng & Units 3/14/2019   Cardiolipin Antibody IgG      0.0 - 19.9 GPL-U/mL <1.6   Cardiolipin Antibody IgM      0.0 - 19.9 MPL-U/mL 0.2   Copath Report       Patient Name: MELISA GRESHAM . . .   Factor 8 Assay      55 - 200 % 157   Homocysteine umol/L      4.0 - 12.0 umol/L 8.2   Beta 2 Glycoprotein 1 Antibody IgM      <7 U/mL 1.0   Beta 2 Glycoprotein 1 Antibody IgG      <7 U/mL 1.0   Lupus Result      NEG:Negative Negative   Protein S Free      55 - 125 % 90   Prot C Chromogenic      70 - 170 % 107   Antithrombin III Chromogenic      85 - 135 % 86   Activated Prot C Resistance Ratio      >2.12 1.90 (L)     CBC RESULTS:   Recent Labs   Lab Test 03/14/19  1421   WBC 8.4   RBC 4.41   HGB 13.5   HCT 40.0   MCV 91   MCH 30.6   MCHC 33.8   RDW 12.1            ASSESSMENT/PLAN:  Melisa Gresham is a 24 year old female with:    1) Factor V Leiden heterozygosity: Patient does not have personal history of thrombosis, although she has family history.  Although her risk of thrombosis is slightly higher, we discussed that patients with FVL heterozygosity is typically treated similarly to the general population.  Prophylactic anticoagulation is not indicated.  If she undergoes surgery, she would undergo the usual routine thromboprophylaxis.  We discussed that she should not take hormone based contraceptives.  She can use non-hormone contraceptives.  She does not smoke and should not start.  If she travels, she should be walking around and stretching her legs every couple of hours.  Patient expressed understanding.  She can follow-up in our clinic as needed.        I spent a total of 30 minutes with the  patient, with over >50% of the time in counseling and/or coordination of care.       Lorri Alexandre MD  Hematology/Oncology  [facility]

## 2019-05-10 ENCOUNTER — HOSPITAL ENCOUNTER (OUTPATIENT)
Dept: GENERAL RADIOLOGY | Facility: CLINIC | Age: 24
Discharge: HOME | End: 2019-05-10
Attending: FAMILY MEDICINE | Admitting: FAMILY MEDICINE
Payer: OTHER GOVERNMENT

## 2019-05-10 DIAGNOSIS — R10.2 PELVIC PAIN IN FEMALE: ICD-10-CM

## 2019-05-10 PROCEDURE — 74740 X-RAY FEMALE GENITAL TRACT: CPT

## 2019-05-16 ENCOUNTER — MYC MEDICAL ADVICE (OUTPATIENT)
Dept: OBGYN | Facility: CLINIC | Age: 24
End: 2019-05-16

## 2019-05-16 ENCOUNTER — OFFICE VISIT (OUTPATIENT)
Dept: OBGYN | Facility: CLINIC | Age: 24
End: 2019-05-16
Payer: OTHER GOVERNMENT

## 2019-05-16 VITALS
WEIGHT: 170.8 LBS | BODY MASS INDEX: 25.3 KG/M2 | SYSTOLIC BLOOD PRESSURE: 144 MMHG | HEIGHT: 69 IN | DIASTOLIC BLOOD PRESSURE: 92 MMHG

## 2019-05-16 DIAGNOSIS — R11.0 NAUSEA: ICD-10-CM

## 2019-05-16 DIAGNOSIS — R52 PAIN: Primary | ICD-10-CM

## 2019-05-16 DIAGNOSIS — E28.2 PCOS (POLYCYSTIC OVARIAN SYNDROME): Primary | ICD-10-CM

## 2019-05-16 DIAGNOSIS — G89.18 ACUTE POST-OPERATIVE PAIN: ICD-10-CM

## 2019-05-16 LAB
ERYTHROCYTE [DISTWIDTH] IN BLOOD BY AUTOMATED COUNT: 12.2 % (ref 10–15)
HCG SERPL QL: NEGATIVE
HCT VFR BLD AUTO: 41.3 % (ref 35–47)
HGB BLD-MCNC: 14 G/DL (ref 11.7–15.7)
MCH RBC QN AUTO: 30.8 PG (ref 26.5–33)
MCHC RBC AUTO-ENTMCNC: 33.9 G/DL (ref 31.5–36.5)
MCV RBC AUTO: 91 FL (ref 78–100)
PLATELET # BLD AUTO: 322 10E9/L (ref 150–450)
RBC # BLD AUTO: 4.54 10E12/L (ref 3.8–5.2)
WBC # BLD AUTO: 7 10E9/L (ref 4–11)

## 2019-05-16 PROCEDURE — 85027 COMPLETE CBC AUTOMATED: CPT | Performed by: FAMILY MEDICINE

## 2019-05-16 PROCEDURE — 83520 IMMUNOASSAY QUANT NOS NONAB: CPT | Mod: 90 | Performed by: FAMILY MEDICINE

## 2019-05-16 PROCEDURE — 99213 OFFICE O/P EST LOW 20 MIN: CPT | Performed by: FAMILY MEDICINE

## 2019-05-16 PROCEDURE — 36415 COLL VENOUS BLD VENIPUNCTURE: CPT | Performed by: FAMILY MEDICINE

## 2019-05-16 PROCEDURE — 99000 SPECIMEN HANDLING OFFICE-LAB: CPT | Performed by: FAMILY MEDICINE

## 2019-05-16 PROCEDURE — 84703 CHORIONIC GONADOTROPIN ASSAY: CPT | Performed by: FAMILY MEDICINE

## 2019-05-16 RX ORDER — CETIRIZINE HYDROCHLORIDE 10 MG/1
10 TABLET ORAL DAILY
COMMUNITY

## 2019-05-16 RX ORDER — DOXYCYCLINE 100 MG/1
100 TABLET ORAL 2 TIMES DAILY
Qty: 14 TABLET | Refills: 0 | Status: SHIPPED | OUTPATIENT
Start: 2019-05-16 | End: 2019-07-03

## 2019-05-16 ASSESSMENT — MIFFLIN-ST. JEOR: SCORE: 1594.12

## 2019-05-16 NOTE — NURSING NOTE
"Chief Complaint   Patient presents with     RECHECK     follow up HSG 5/10/19--feeling abdominal pain and cramps--feels nausaous and tired and dizzy       Initial BP (!) 144/92 (BP Location: Right arm, Patient Position: Sitting, Cuff Size: Adult Regular)   Ht 1.753 m (5' 9\")   Wt 77.5 kg (170 lb 12.8 oz)   BMI 25.22 kg/m   Estimated body mass index is 25.22 kg/m  as calculated from the following:    Height as of this encounter: 1.753 m (5' 9\").    Weight as of this encounter: 77.5 kg (170 lb 12.8 oz).  BP completed using cuff size: regular    Questioned patient about current smoking habits.  Pt. has never smoked.          The following HM Due: NONE        "

## 2019-05-16 NOTE — PROGRESS NOTES
Subjective: Kindra is a 24 year old  female   status post XR Hysterosalpingogram on 05/10/2019, here for incision check.  Doing well, denies fever, significant pain.    Is *** taking pain medications.   States some light vaginal bleeding.            - ***          ***  Objective:  EXAM:  There were no vitals taken for this visit.  Constitutional: healthy, alert and no distress  Gastrointestinal: Abdomen soft, non-tender. Incision intact, no erthema, drainage.        Assessment/Plan: 24 year old female status post XR Hysterosalpingogram on 05/10/2019.  V67.00C Surgery Follow-Up Examination  (primary encounter diagnosis)    Plan: ***              ***  Dr. Stephanie Alicia, DO    OB/GYN   [facility]

## 2019-05-16 NOTE — PROGRESS NOTES
"S: Kindra is a 24 year old  female who presents to clinic today with concerns of infection after XR Hysterosalpingogram on 05/10/2019.        XR HSG Results:  \"IMPRESSION:  1. Suggestion of occlusion of the left fallopian tube.  2. The right fallopian tube shows a smooth patency. However, free  spillage from the right side is not completely confirmed on this exam.  3. Normal endometrial contour.\"      - Pt states she is experiencing abdominal pain & cramping, with accompanying nausea & fatigue/dizziness. She denies a fever & her temp was normal when checked today in clinic. Of note, her BP is elevated today at 144/92.         This document serves as a record of the services and decisions personally performed and made by Stephanie Alicia DO. It was created on her behalf by Yajaira Sarmiento, a trained medical scribe. The creation of this document is based the provider's statements to the medical scribe.  Scribe Yajaira Sarmiento 1:32 PM, May 16, 2019    O: BP (!) 144/92 (BP Location: Right arm, Patient Position: Sitting, Cuff Size: Adult Regular)   Ht 1.753 m (5' 9\")   Wt 77.5 kg (170 lb 12.8 oz)   BMI 25.22 kg/m     GENERAL healthy, alert and no distress  CV: NEGATIVE  Chest: CTA   ABDOMEN: soft, no tenderness, no  hepatosplenomegaly, no masses, normal bowel sounds        Assessment:  24 year old y/o  presents with following issues:    1. AMH pending; Informed of HSG results -- Discussed treatment options   - Clomid, Femara   - Infertility referral   2. Pt considering Clomid, will discuss further at NOV  3.  CBC & beta HCG pending; Antibiotic called in, advised to begin taking & monitor symptoms   - Recommended to use lots of sunscreen while taking doxycycline, as this  may cause her skin to burn easily    - If not better in 2 days, advised to switch to Augmentin [rx given]  4. Return to clinic in 1 week for follow-up; otherwise as needed.       Rx:  - doxycycline 100 mg 1 tab po BID for 7 days  -  Augmentin " 875-125 mg 1 tab po BID for 7 days          The information in this document, created by the medical scribe for me, accurately reflects the services I personally performed and the decisions made by me. I have reviewed and approved this document for accuracy prior to leaving the patient care area.  1:32 PM, 05/16/19    Dr. Stephanie Alicia, DO    Obstetrics and Gynecology  [facility] and [place]

## 2019-05-16 NOTE — TELEPHONE ENCOUNTER
Please see Init[employer] message and advise.  Pt had HSG on 5/10/19    Shelly IQBAL R.N.  [facility]

## 2019-05-16 NOTE — PATIENT INSTRUCTIONS
Start with labs     Return in 1 week       Doxycycline if not better in 48 hours or worse in 24 hours switch to augmentin     Fever -CALL       Dr. Stephanie Alicia,     Obstetrics and Gynecology  [facility] - [place] and [place]

## 2019-05-18 LAB — MIS SERPL-MCNC: 2.52 NG/ML (ref 0.4–16.02)

## 2019-05-21 NOTE — PROGRESS NOTES
SUBJECTIVE:  Kindra Gresham is an 24 year old  woman who presents for   Gyn follow-up exam. She was seen on 2019, for abdominal pain/concern for infection & anovulation.    Today they are concerned about tubal patency and possibility of ectopic.      Last time her treatment was:  1. AMH pending; Informed of HSG results -- Discussed treatment options              - Clomid, Femara              - Infertility referral   2. Pt considering Clomid, will discuss further at NOV  3.  CBC & beta HCG pending; Antibiotic called in, advised to begin taking & monitor symptoms              - Recommended to use lots of sunscreen while taking doxycycline, as this             may cause her skin to burn easily               - If not better in 2 days, advised to switch to Augmentin [rx given]        Today in Clinic:  - Pt states she is doing much better today, ready to move forward with Clomid treatment to help her achieve pregnancy.     - Pt does report her pelvic/lower abdominal pain has not resolved -- typically experiences moderate cramping every morning & is worse during her menses.         Past Medical History:   Diagnosis Date     Abnormal Pap smear of cervix 2018: See problem list.      S/P LEEP 10/11/2018    03/28/18: [facility] and LSIL pap (abstracted records) 18: [facility] HSIL,CINDY 2 with LSIL also present ECC Neg for dysplasia (abstracted records) 18: LEEP CINDY 2 with LSIL also present, margins clear. (abstracted records) 18: NIL pap, + HR HPV (not 16 or 18) result. Plan [facility] per provider.  19: [facility] Neg for dysplasia. Plan cotest in 1 year.           Family History   Problem Relation Age of Onset     Pulmonary Embolism Father         Dx-57     Myocardial Infarction Maternal Grandfather         Dx-68     Endometriosis Paternal Grandmother      Deep Vein Thrombosis Paternal Grandmother        Past Surgical History:   Procedure Laterality Date     DAVINCI PELVIC PROCEDURE  N/A 3/6/2019    Procedure: robotic assisted laparoscopic endometriosis resection/fulguration, hysterscopic septum removal, dilation and curettage, chromotubation;  Surgeon: Stephanie Alicia DO;  Location: [facility] OR     DILATION AND CURETTAGE, OPERATIVE HYSTEROSCOPY WITH MORCELLATOR, COMBINED N/A 3/6/2019    Procedure: COMBINED DILATION AND CURETTAGE, OPERATIVE HYSTEROSCOPY WITH MORCELLATOR;  Surgeon: Stephanie Alicia DO;  Location: [facility] OR     LEEP TX, CERVICAL  04/20/2018 04/20/18: LEEP CINDY 2 with LSIL also present, margins clear. (abstracted records)     ORTHOPEDIC SURGERY Right 2017    Arthroscopy Right Knee       Current Outpatient Medications   Medication     cetirizine (ZYRTEC) 10 MG tablet     doxycycline monohydrate (ADOXA) 100 MG tablet     melatonin 5 MG CAPS     Prenatal Vit-Fe Fumarate-FA (PRENATAL VITAMIN PO)     amoxicillin-clavulanate (AUGMENTIN) 875-125 MG tablet     cyclobenzaprine (FLEXERIL) 5 MG tablet     diclofenac (CATAFLAM) 50 MG tablet     ibuprofen (ADVIL/MOTRIN) 800 MG tablet     norgestimate-ethinyl estradiol (ORTHO-CYCLEN/SPRINTEC) 0.25-35 MG-MCG tablet     No current facility-administered medications for this visit.      No Known Allergies    Social History     Tobacco Use     Smoking status: Never Smoker     Smokeless tobacco: Never Used   Substance Use Topics     Alcohol use: Yes     Comment: Occasional       Review Of Systems  Ears/Nose/Throat: negative  Respiratory: No shortness of breath, dyspnea on exertion, cough, or hemoptysis  Cardiovascular: negative  Gastrointestinal: negative  Genitourinary: negative  Constitutional, HEENT, cardiovascular, pulmonary, GI, , musculoskeletal, neuro, skin, endocrine and psych systems are negative, except as otherwise noted.        This document serves as a record of the services and decisions personally performed and made by Stephanie Alicia DO. It was created on her behalf by Yajaira Sarmiento, a trained medical scribe. The creation of this  "document is based the provider's statements to the medical scribe.  Rohan Sarmiento 3:01 PM, May 22, 2019    OBJECTIVE:  BP (!) 130/96 (BP Location: Left arm, Patient Position: Sitting, Cuff Size: Adult Regular)   Ht 1.753 m (5' 9\")   Wt 76.2 kg (168 lb 1.6 oz)   LMP 2019 (Exact Date)   BMI 24.82 kg/m    General appearance: healthy, alert and no distress  Skin: Skin color, texture, turgor normal. No rashes or lesions.  Lungs: negative, Percussion normal. Good diaphragmatic excursion. Lungs clear  Heart: negative, PMI normal. No lifts, heaves, or thrills. RRR. No murmurs, clicks gallops or rub  Abdomen: Abdomen soft, non-tender. BS normal. No masses, organomegaly            ASSESSMENT:  Kindra Gresham is an 24 year old  woman who presents for   Gyn follow-up exam. She was seen on 2019, for abdominal pain/concern for infection & anovulation.    Today they are concerned about tubal patency and possibility of ectopic.   PLAN:  Dx: PCOS; Pelvic pain  1)  Discussed treatment options   - Clomid, Femara   - Infertility specialist referral (IUI vs IVF)   2)  Pt &  elect to proceed with infertility specialist referral at this time   - Information given on 4 clinics in the area  3)  Pelvic pain: Urology referral given to rule out interstitial cystitis   - Discussed pain management options for pelvic pain    > Lyrica, Pamelor    > Flexeril    > PT, Acupuncture  4)  Pt elects to proceed with a PT referral and urology referral at this time, since she is currently trying for pregnancy  5)  Return to clinic as needed; otherwise for annual health maintenance      Rx:  None          The information in this document, created by the medical scribe for me, accurately reflects the services I personally performed and the decisions made by me. I have reviewed and approved this document for accuracy prior to leaving the patient care area.  3:01 PM, 19    Dr. Stephanie Alicia, DO    OB/GYN "   [facility]

## 2019-05-22 ENCOUNTER — OFFICE VISIT (OUTPATIENT)
Dept: OBGYN | Facility: CLINIC | Age: 24
End: 2019-05-22
Payer: OTHER GOVERNMENT

## 2019-05-22 VITALS
BODY MASS INDEX: 24.9 KG/M2 | HEIGHT: 69 IN | WEIGHT: 168.1 LBS | SYSTOLIC BLOOD PRESSURE: 130 MMHG | DIASTOLIC BLOOD PRESSURE: 96 MMHG

## 2019-05-22 DIAGNOSIS — R10.2 PELVIC PAIN IN FEMALE: ICD-10-CM

## 2019-05-22 DIAGNOSIS — E28.2 PCOS (POLYCYSTIC OVARIAN SYNDROME): Primary | ICD-10-CM

## 2019-05-22 PROCEDURE — 99213 OFFICE O/P EST LOW 20 MIN: CPT | Performed by: FAMILY MEDICINE

## 2019-05-22 ASSESSMENT — MIFFLIN-ST. JEOR: SCORE: 1581.88

## 2019-05-22 NOTE — NURSING NOTE
"Chief Complaint   Patient presents with     RECHECK     follow up--doing good       Initial BP (!) 130/96 (BP Location: Left arm, Patient Position: Sitting, Cuff Size: Adult Regular)   Ht 1.753 m (5' 9\")   Wt 76.2 kg (168 lb 1.6 oz)   LMP 2019 (Exact Date)   BMI 24.82 kg/m   Estimated body mass index is 24.82 kg/m  as calculated from the following:    Height as of this encounter: 1.753 m (5' 9\").    Weight as of this encounter: 76.2 kg (168 lb 1.6 oz).  BP completed using cuff size: regular    Questioned patient about current smoking habits.  Pt. has never smoked.          The following HM Due: NONE      "

## 2019-05-22 NOTE — PATIENT INSTRUCTIONS
Referrals today     Dr. Stephanie Alicia, DO    Obstetrics and Gynecology  [facility] - [place] and [place]

## 2019-05-24 ENCOUNTER — TELEPHONE (OUTPATIENT)
Dept: FAMILY MEDICINE | Facility: CLINIC | Age: 24
End: 2019-05-24

## 2019-05-24 ENCOUNTER — DOCUMENTATION ONLY (OUTPATIENT)
Dept: FAMILY MEDICINE | Facility: CLINIC | Age: 24
End: 2019-05-24

## 2019-05-24 NOTE — PROGRESS NOTES
Spoke with Kindra and she is going to make an appointment at [facility].   Lindsey referral sumittted via online to [facility].   Request ID # [id number]

## 2019-06-04 ENCOUNTER — THERAPY VISIT (OUTPATIENT)
Dept: PHYSICAL THERAPY | Facility: CLINIC | Age: 24
End: 2019-06-04
Attending: FAMILY MEDICINE
Payer: OTHER GOVERNMENT

## 2019-06-04 DIAGNOSIS — R10.2 PELVIC PAIN IN FEMALE: ICD-10-CM

## 2019-06-04 PROCEDURE — 97110 THERAPEUTIC EXERCISES: CPT | Mod: GP | Performed by: PHYSICAL THERAPIST

## 2019-06-04 PROCEDURE — 97161 PT EVAL LOW COMPLEX 20 MIN: CPT | Mod: GP | Performed by: PHYSICAL THERAPIST

## 2019-06-04 PROCEDURE — 97140 MANUAL THERAPY 1/> REGIONS: CPT | Mod: GP | Performed by: PHYSICAL THERAPIST

## 2019-06-04 NOTE — PROGRESS NOTES
Physical Therapy Initial Evaluation/Plan of Care    History of current episode:    Onset date/MD order: MD referral 5/22/19.    CC/Present symptoms: Kindra presents with pelvic pain, ongoing for several years. Pain worsened around age 16-17 and has gradually worsened over time. Recently did a laparoscopic resection for endometriosis, didn't find anything. Diagnosed with blocked fallopian tubes, hoping to get pregnant soon. Noticing a lot of pain 2-3 weeks of the month on a regular basis, worse with her period. Pain killers don't seem to help. Works out 4-6x/weekly, can help a little bit but doesn't seem to impact things when she is on her period.    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 No   Do you leak with cough,sneeze, jumping, running?No   Any other activities that cause leaking? No   Do you have triggers that make you feel you can't wait to go to the bathroom? No  Do you strain to pass urine? No  Do you have a slow or hesitant urinary stream? No  Do you have difficulty initiating the urine stream? No  Is urination painful? No    Bowel habits:  Frequency of bowel movements? 4-5 times a week  Consistancy of stool? soft formed  Do you ignore the urge to defecate? No  Do you strain to pass stool? No     Pelvic Pain:  Do you have any pelvic pain with intercourse, exams, use of tampons? Sometimes - can notice increased cramping/pain following  intercourse, typically lasts for a few hours  Is initial penetration during intercourse painful? No  Is deeper penetration painful? sometimes  Do you use lubricant? No  Are you sexually active?Yes  Have you ever been worried for your physical safety? No  Have you practiced the PF(kegel) exercises for 4 or more weeks? no  Marinoff Scale:Level 2  (Level 3: Abstinence from intercourse because of severe pain. Level 2: Painful intercourse which limites frequency of activity. Level 1: Painful intercourse not severe enough to prevent activity.)    Treatment/Education provided this session (see flow sheet for information):      Flexibility:   L R   Adductors WNL WNL   Hamstrings WNL WNL   Piriformis Mild restriction Mild restriction   Gluteals Mild restriction Mild restriction   Iliopsoas WNL WNL     Abdominal Wall  Diastasis recti: none  Trigger points: normal, none noted    External Exam:  Skin condition: normal  Scars: none  Is tissue symmetrical?: yes  Introitus: normal  Muscle contraction - pelvic elevation present/slight/absent? present  Perineal mobility - descent/no movement? No movement  Cystocele/Rectocele/none?: none    Internal Exam    Sensation intact?: yes    Muscle exam   Neg Pain (-) Min Pain (+) Mod Pain (++) Severe Pain (+++) Increased tone?   Ischiocavernosus  + on L   Yes on L   Bulbocavernosus -    no   Transverse Perineals   ++ on L  Yes on L   Levator Ani   ++ on R  Yes on R   Perineal Body -         Contraction Grade  0 (no contraction)    1 (flicker)    2 (weak squeeze, 2 sec hold)    3 (fair squeeze, definite lift) X   4 (good squeeze, good hold, repeatable)    5 (strong squeeze, good hold, repeatable)      Accessory use of gluteals noted with contraction.    Patient is a 24 year old female with pelvic complaints.    Patient has the following significant findings with corresponding treatment plan.                Diagnosis 1:  Pelvic floor myalgia  Pain -  manual therapy, self management, education  and home program  Decreased ROM/flexibility - manual therapy and therapeutic exercise  Decreased proprioception - neuro re-education and therapeutic activities  Impaired muscle performance - neuro re-education  Decreased function - therapeutic activities    Therapy Evaluation Codes:   1) History comprised of:   Personal factors that impact the plan of care:      None.    Comorbidity factors that impact the plan of care are:      None.     Medications impacting care: None.  2) Examination of Body Systems comprised of:   Body structures and functions that impact the plan of care:      Pelvis.   Activity limitations that impact the plan of care are:      [place].  3) Clinical presentation characteristics are:   Stable/Uncomplicated.  4) Decision-Making    Low complexity using standardized patient assessment instrument and/or measureable assessment of functional outcome.  Cumulative Therapy Evaluation is: Low complexity.    Previous and current functional limitations:  (See Goal Flow Sheet for this information)    Short term and Long term goals: (See Goal Flow Sheet for this information)     Communication ability:  Patient appears to be able to clearly communicate and understand verbal and written communication and follow directions correctly.  Treatment Explanation - The following has been discussed with the patient:   RX ordered/plan of care  Anticipated outcomes  Possible risks and side effects  This patient would benefit from PT intervention to resume normal activities.   Rehab potential is good.    Frequency:  1 X week, once daily x 6 weeks then tapering to 1x every other week x 6 weeks  Duration:  for 3 months  Discharge Plan:  Achieve all LTG.  Independent in home treatment program.  Reach maximal therapeutic benefit.    Please refer to the daily flowsheet for treatment today, total treatment time and time spent performing 1:1 timed codes.

## 2019-06-11 ENCOUNTER — THERAPY VISIT (OUTPATIENT)
Dept: PHYSICAL THERAPY | Facility: CLINIC | Age: 24
End: 2019-06-11
Payer: OTHER GOVERNMENT

## 2019-06-11 DIAGNOSIS — M79.18 MYALGIA OF PELVIC FLOOR: ICD-10-CM

## 2019-06-11 PROCEDURE — 97140 MANUAL THERAPY 1/> REGIONS: CPT | Mod: GP | Performed by: PHYSICAL THERAPIST

## 2019-06-11 PROCEDURE — 97535 SELF CARE MNGMENT TRAINING: CPT | Mod: GP | Performed by: PHYSICAL THERAPIST

## 2019-07-01 ENCOUNTER — APPOINTMENT (OUTPATIENT)
Dept: CT IMAGING | Facility: CLINIC | Age: 24
End: 2019-07-01
Attending: NURSE PRACTITIONER
Payer: OTHER GOVERNMENT

## 2019-07-01 ENCOUNTER — APPOINTMENT (OUTPATIENT)
Dept: ULTRASOUND IMAGING | Facility: CLINIC | Age: 24
End: 2019-07-01
Attending: NURSE PRACTITIONER
Payer: OTHER GOVERNMENT

## 2019-07-01 ENCOUNTER — HOSPITAL ENCOUNTER (EMERGENCY)
Facility: CLINIC | Age: 24
Discharge: HOME | End: 2019-07-01
Attending: NURSE PRACTITIONER | Admitting: NURSE PRACTITIONER
Payer: OTHER GOVERNMENT

## 2019-07-01 VITALS
RESPIRATION RATE: 18 BRPM | BODY MASS INDEX: 24.2 KG/M2 | OXYGEN SATURATION: 100 % | SYSTOLIC BLOOD PRESSURE: 144 MMHG | TEMPERATURE: 97.8 F | HEART RATE: 74 BPM | WEIGHT: 169 LBS | HEIGHT: 70 IN | DIASTOLIC BLOOD PRESSURE: 88 MMHG

## 2019-07-01 DIAGNOSIS — R10.84 ABDOMINAL PAIN, GENERALIZED: ICD-10-CM

## 2019-07-01 DIAGNOSIS — N93.9 VAGINAL BLEEDING: ICD-10-CM

## 2019-07-01 LAB
ALBUMIN SERPL-MCNC: 4.1 G/DL (ref 3.4–5)
ALBUMIN UR-MCNC: NEGATIVE MG/DL
ALP SERPL-CCNC: 50 U/L (ref 40–150)
ALT SERPL W P-5'-P-CCNC: 25 U/L (ref 0–50)
ANION GAP SERPL CALCULATED.3IONS-SCNC: 8 MMOL/L (ref 3–14)
APPEARANCE UR: CLEAR
AST SERPL W P-5'-P-CCNC: 13 U/L (ref 0–45)
B-HCG FREE SERPL-ACNC: <5 IU/L
BASOPHILS # BLD AUTO: 0 10E9/L (ref 0–0.2)
BASOPHILS NFR BLD AUTO: 0.5 %
BILIRUB SERPL-MCNC: 0.2 MG/DL (ref 0.2–1.3)
BILIRUB UR QL STRIP: NEGATIVE
BUN SERPL-MCNC: 16 MG/DL (ref 7–30)
CALCIUM SERPL-MCNC: 9.4 MG/DL (ref 8.5–10.1)
CHLORIDE SERPL-SCNC: 106 MMOL/L (ref 94–109)
CO2 SERPL-SCNC: 26 MMOL/L (ref 20–32)
COLOR UR AUTO: ABNORMAL
CREAT SERPL-MCNC: 0.81 MG/DL (ref 0.52–1.04)
DIFFERENTIAL METHOD BLD: NORMAL
EOSINOPHIL # BLD AUTO: 0.1 10E9/L (ref 0–0.7)
EOSINOPHIL NFR BLD AUTO: 0.9 %
ERYTHROCYTE [DISTWIDTH] IN BLOOD BY AUTOMATED COUNT: 12.1 % (ref 10–15)
GFR SERPL CREATININE-BSD FRML MDRD: >90 ML/MIN/{1.73_M2}
GLUCOSE SERPL-MCNC: 91 MG/DL (ref 70–99)
GLUCOSE UR STRIP-MCNC: NEGATIVE MG/DL
HCT VFR BLD AUTO: 42.2 % (ref 35–47)
HGB BLD-MCNC: 14.2 G/DL (ref 11.7–15.7)
HGB UR QL STRIP: NEGATIVE
IMM GRANULOCYTES # BLD: 0 10E9/L (ref 0–0.4)
IMM GRANULOCYTES NFR BLD: 0.2 %
KETONES UR STRIP-MCNC: NEGATIVE MG/DL
LEUKOCYTE ESTERASE UR QL STRIP: NEGATIVE
LIPASE SERPL-CCNC: 127 U/L (ref 73–393)
LYMPHOCYTES # BLD AUTO: 3.2 10E9/L (ref 0.8–5.3)
LYMPHOCYTES NFR BLD AUTO: 38.9 %
MCH RBC QN AUTO: 30.4 PG (ref 26.5–33)
MCHC RBC AUTO-ENTMCNC: 33.6 G/DL (ref 31.5–36.5)
MCV RBC AUTO: 90 FL (ref 78–100)
MONOCYTES # BLD AUTO: 0.6 10E9/L (ref 0–1.3)
MONOCYTES NFR BLD AUTO: 7.1 %
MUCOUS THREADS #/AREA URNS LPF: PRESENT /LPF
NEUTROPHILS # BLD AUTO: 4.3 10E9/L (ref 1.6–8.3)
NEUTROPHILS NFR BLD AUTO: 52.4 %
NITRATE UR QL: NEGATIVE
NRBC # BLD AUTO: 0 10*3/UL
NRBC BLD AUTO-RTO: 0 /100
PH UR STRIP: 6.5 PH (ref 5–7)
PLATELET # BLD AUTO: 327 10E9/L (ref 150–450)
POTASSIUM SERPL-SCNC: 3.6 MMOL/L (ref 3.4–5.3)
PROT SERPL-MCNC: 7.7 G/DL (ref 6.8–8.8)
RBC # BLD AUTO: 4.67 10E12/L (ref 3.8–5.2)
RBC #/AREA URNS AUTO: 0 /HPF (ref 0–2)
SODIUM SERPL-SCNC: 140 MMOL/L (ref 133–144)
SOURCE: ABNORMAL
SP GR UR STRIP: 1.01 (ref 1–1.03)
SPECIMEN SOURCE: NORMAL
SQUAMOUS #/AREA URNS AUTO: 1 /HPF (ref 0–1)
UROBILINOGEN UR STRIP-MCNC: NORMAL MG/DL (ref 0–2)
WBC # BLD AUTO: 8.2 10E9/L (ref 4–11)
WBC #/AREA URNS AUTO: <1 /HPF (ref 0–5)
WET PREP SPEC: NORMAL

## 2019-07-01 PROCEDURE — 80053 COMPREHEN METABOLIC PANEL: CPT | Performed by: NURSE PRACTITIONER

## 2019-07-01 PROCEDURE — 25000128 H RX IP 250 OP 636: Performed by: NURSE PRACTITIONER

## 2019-07-01 PROCEDURE — 93976 VASCULAR STUDY: CPT

## 2019-07-01 PROCEDURE — 87591 N.GONORRHOEAE DNA AMP PROB: CPT | Performed by: NURSE PRACTITIONER

## 2019-07-01 PROCEDURE — 85025 COMPLETE CBC W/AUTO DIFF WBC: CPT | Performed by: NURSE PRACTITIONER

## 2019-07-01 PROCEDURE — 84702 CHORIONIC GONADOTROPIN TEST: CPT

## 2019-07-01 PROCEDURE — 96374 THER/PROPH/DIAG INJ IV PUSH: CPT | Mod: 59

## 2019-07-01 PROCEDURE — 99285 EMERGENCY DEPT VISIT HI MDM: CPT | Mod: 25

## 2019-07-01 PROCEDURE — 96361 HYDRATE IV INFUSION ADD-ON: CPT

## 2019-07-01 PROCEDURE — 93005 ELECTROCARDIOGRAM TRACING: CPT

## 2019-07-01 PROCEDURE — 87491 CHLMYD TRACH DNA AMP PROBE: CPT | Performed by: NURSE PRACTITIONER

## 2019-07-01 PROCEDURE — 83690 ASSAY OF LIPASE: CPT | Performed by: NURSE PRACTITIONER

## 2019-07-01 PROCEDURE — 87210 SMEAR WET MOUNT SALINE/INK: CPT | Performed by: NURSE PRACTITIONER

## 2019-07-01 PROCEDURE — 74177 CT ABD & PELVIS W/CONTRAST: CPT

## 2019-07-01 PROCEDURE — 81001 URINALYSIS AUTO W/SCOPE: CPT | Performed by: NURSE PRACTITIONER

## 2019-07-01 RX ORDER — IOPAMIDOL 755 MG/ML
500 INJECTION, SOLUTION INTRAVASCULAR ONCE
Status: COMPLETED | OUTPATIENT
Start: 2019-07-01 | End: 2019-07-01

## 2019-07-01 RX ORDER — ONDANSETRON 2 MG/ML
4 INJECTION INTRAMUSCULAR; INTRAVENOUS EVERY 30 MIN PRN
Status: DISCONTINUED | OUTPATIENT
Start: 2019-07-01 | End: 2019-07-01 | Stop reason: HOSPADM

## 2019-07-01 RX ORDER — ONDANSETRON 4 MG/1
4 TABLET, ORALLY DISINTEGRATING ORAL EVERY 8 HOURS PRN
Qty: 10 TABLET | Refills: 0 | Status: SHIPPED | OUTPATIENT
Start: 2019-07-01 | End: 2019-07-03

## 2019-07-01 RX ADMIN — SODIUM CHLORIDE 60 ML: 9 INJECTION, SOLUTION INTRAVENOUS at 15:45

## 2019-07-01 RX ADMIN — SODIUM CHLORIDE 1000 ML: 9 INJECTION, SOLUTION INTRAVENOUS at 15:13

## 2019-07-01 RX ADMIN — IOPAMIDOL 80 ML: 755 INJECTION, SOLUTION INTRAVENOUS at 15:45

## 2019-07-01 RX ADMIN — ONDANSETRON HYDROCHLORIDE 4 MG: 2 INJECTION, SOLUTION INTRAMUSCULAR; INTRAVENOUS at 15:11

## 2019-07-01 ASSESSMENT — ENCOUNTER SYMPTOMS
FREQUENCY: 0
NAUSEA: 1
VOMITING: 0
HEMATURIA: 0
CHILLS: 1
ABDOMINAL PAIN: 1
DIAPHORESIS: 1
DYSURIA: 0
FEVER: 0

## 2019-07-01 ASSESSMENT — MIFFLIN-ST. JEOR: SCORE: 1601.83

## 2019-07-01 NOTE — ED AVS SNAPSHOT
[facility] Emergency Department  [address]  [facility] [address]  Phone:  [phone]  Fax:  [phone]                                    Kindra Gresham   MRN: [id number]    Department:  [facility] Emergency Department   Date of Visit:  7/1/2019           After Visit Summary Signature Page    I have received my discharge instructions, and my questions have been answered. I have discussed any challenges I see with this plan with the nurse or doctor.    ..........................................................................................................................................  Patient/Patient Representative Signature      ..........................................................................................................................................  Patient Representative Print Name and Relationship to Patient    ..................................................               ................................................  Date                                   Time    ..........................................................................................................................................  Reviewed by Signature/Title    ...................................................              ..............................................  Date                                               Time          22EPIC Rev 08/18

## 2019-07-01 NOTE — ED PROVIDER NOTES
History     Chief Complaint:  Abdominal Pain      HPI   Kindra Gresham is a 24 year old female with a history of myalgia of pelvic floor, HSIL, possible PCOS and Factor V Leiden carrier, who presents with heavier than usual menstrual period bleeding beginning on 6/28/19, which she notes has lightened today, along with abdominal pain and nausea for the past day, prompting her to visit the ED. The patient reports that this month her menses was 5 days late, but reports taking 4 pregnancy tests which have all been negative. She endorses that she has been trying to get pregnant. She denies any blood clots. She reports that she experienced cramping a couple of days ago, but this was similar to the pelvic pain she has had in the past. She notes yesterday the pain worsened and was present across her entire abdomen, prompting visit to ED today.  She reports changing her tampon once so far today. She endorses chills and diaphoresis. She notes that she took ibuprofen for her pain yesterday, with no relief of symptoms. She states that her last bowel movement was 1 day ago. She denies any abnormal vaginal discharge, vomiting, fever, or any urinary changes.     Allergies:  NDKA     Medications:    Zyrtec  Flexeril   Cataflam  Melatonin   Prenatal vitamins    Past Medical History:    Myalgia of pelvic floor   Factor V Leiden carrier   HSIL    Past Surgical History:    DaVinci Pelvic procedure  D&C  Hysteroscopy  LEEP, cervical  Orthopedic surgery     Family History:    PE  MI  Endometriosis  DVT    Social History:  Smoking status: no  Alcohol use: yes  Drug use: no  PCP: Drea Chau  Marital Status:        Review of Systems   Constitutional: Positive for chills and diaphoresis. Negative for fever.   Gastrointestinal: Positive for abdominal pain and nausea. Negative for vomiting.   Genitourinary: Positive for vaginal bleeding. Negative for dysuria, frequency, hematuria, urgency and vaginal discharge.  "  All other systems reviewed and are negative.      Physical Exam     Patient Vitals for the past 24 hrs:   BP Temp Temp src Pulse Heart Rate Resp SpO2 Height Weight   07/01/19 1634 -- -- -- -- -- -- 100 % -- --   07/01/19 1630 144/88 -- -- 74 -- -- 99 % -- --   07/01/19 1500 (!) 158/94 -- -- 73 66 -- 100 % -- --   07/01/19 1445 (!) 169/103 -- -- 89 89 -- 100 % -- --   07/01/19 1439 (!) 150/101 -- -- 75 78 -- 100 % -- --   07/01/19 1436 (!) 167/107 97.8  F (36.6  C) Oral 89 -- 18 100 % 1.778 m (5' 10\") 76.7 kg (169 lb)       Physical Exam  Constitutional: Alert, attentive, GCS 15.    HENT: Mucous membranes are moist.   Eyes: EOM are normal. Conjunctiva pink, no scleral icterus or conjunctival injection  CV: regular rate and rhythm; no murmurs, rubs or gallups. Cap refill <2 seconds.  Respiratory: Effort normal. Lungs clear to auscultation bilaterally. No crackles/rubs/wheezes.  Good air movement.  GI:  Left upper quadrant tenderness; no rebound or guarding. No distension. Normal bowel sounds.  :  Vulva: No external lesions, normal hair distribution, no adenopathy  Vagina: Moist, pink, trace amount blood noted in vault, well rugated, no lesions  Cervix: smooth, pink, no visible lesions, no CMT   Uterus: Normal size, non-tender, mobile   Ovaries: No mass, non-tender, mobile   Cultures for GC, Chlamydia, and Trichomonas were  taken  MSK: Normal range of motion. No peripheral edema or calf tenderness.  Neurological: Alert, attentive.  Skin: Skin is warm and dry.  No rashes or petechiae.  Psychiatric: Normal affect.    Emergency Department Course   ECG:  ECG (14:40:06):  Rate 75 bpm. MA interval 130. QRS duration 94. QT/QTc 374/417. P-R-T axes 65 30 20. Normal sinus rhythm. Normal ECG. Agree with computer interpretation. Interpreted at 1447 by Mary Bryan APRN.    Imaging:  Radiographic findings were communicated with the patient who voiced understanding of the findings.    CT Abdomen Pelvis w Contrast  No acute " inflammatory process identified.   As read by Radiology.    US Pelvis Cmplt w Transvag & Doppler Lmt Pel Duplex Limited  Bilateral ovarian numerous small peripheral follicles  raising the possibility of polycystic ovary syndrome. Otherwise  unremarkable exam.  As read by Radiology.    Laboratory:  Neisseria gonorrhea PCR: pending  Chlamydia trachomatis PCR: pending    Wet prep: moderate PMNs seen, o/w negative   UA: mucous present, o/w negative     CMP: WNL (Creatinine: 0.81)  CBC: WNL (WBC 8.2, HGB 14.2, )  Lipase: 127  HCG Quant: <5.0    Interventions:  1511: Zofran 4 mg IV  1513: NS 1L IV Bolus    Emergency Department Course:  1439: Nursing notes and vitals reviewed. I performed an exam of the patient as documented above.     IV inserted. Medicine administered as documented above. Blood drawn. This was sent to the lab for further testing, results above. The patient provided a urine sample here in the emergency department. This was sent for laboratory testing, findings above.     The patient was sent for an abdominal CT and pelvis ultrasound while in the emergency department, findings above.     1630: I rechecked the patient and discussed the results of her workup thus far.     1755: I rechecked the patient and discussed the results of her workup thus far.     Findings and plan explained to the Patient. Patient discharged home with instructions regarding supportive care, medications, and reasons to return. The importance of close follow-up was reviewed.     I personally reviewed the laboratory results with the Patient and answered all related questions prior to discharge.     Impression & Plan      Medical Decision Making:  Kindra Gresham is a 24 year old female who presents with abdominal pain and concern for heavy menses.  The patient is not pregnant and therefore there is no concern for miscarriage, ectopic pregnancy, etc.  Blood work reassuring without leukocytosis, anemia, hepatic  dysfunction, pancreatitis, etc.  CT abdomen obtained due to diffuse tenderness without acute findings to account for pain.  Patient was then sent for pelvic ultrasound which did show several small peripheral follicles consistent with possible PCOS but no further acute findings.  Repeat abdominal exam was benign.  Her menses has lightened considerably and there is no concern with brisk bleeding.  She was able to tolerate p.o. without difficulty after Zofran.  At this point my suspicion of an intra-abdominal catastrophe or other worrisome etiology is very low.  She looks overall well with stable vital signs.  I will not therefore admit her for serial exams and further workup. Plan is home with abdominal pain recheck by primary care physician or return to ED at anytime.  Discussed scheduling ibuprofen over the next 2 to 3 days and using Tylenol as needed for breakthrough pain.  Return for fevers, increasing pain, other new symptoms develop.  Abdominal pain handout given.  Questions were answered.     Critical Care time:  none    Diagnosis:    ICD-10-CM    1. Abdominal pain, generalized R10.84    2. Vaginal bleeding N93.9        Disposition:  discharged to home    Discharge Medications:     Medication List      Started    ondansetron 4 MG ODT tab  Commonly known as:  ZOFRAN ODT  4 mg, Oral, EVERY 8 HOURS PRN          ICamila, am serving as a scribe at 2:38 PM on 7/1/2019 to document services personally performed by Mary Bryan APRN based on my observations and the provider's statements to me.       Camila Plummer  7/1/2019   [facility] EMERGENCY DEPARTMENT       Mary Bryan APRN CNP  07/02/19 1000

## 2019-07-01 NOTE — ED TRIAGE NOTES
"Pt  Reports period this month 5 days late, heavier than usual. C/o abdominal \"pressure\", short of breath off and on over the weekend, worse today.   "

## 2019-07-02 LAB
C TRACH DNA SPEC QL NAA+PROBE: NEGATIVE
INTERPRETATION ECG - MUSE: NORMAL
N GONORRHOEA DNA SPEC QL NAA+PROBE: NEGATIVE
SPECIMEN SOURCE: NORMAL
SPECIMEN SOURCE: NORMAL

## 2019-07-02 NOTE — RESULT ENCOUNTER NOTE
Final result for both N. Gonorrhoeae PCR and Chlamydia Trachomatis PCR are NEGATIVE.  No treatment or change in treatment per [facility] ED Lab Result protocol.

## 2019-07-03 ENCOUNTER — OFFICE VISIT (OUTPATIENT)
Dept: FAMILY MEDICINE | Facility: CLINIC | Age: 24
End: 2019-07-03
Payer: OTHER GOVERNMENT

## 2019-07-03 VITALS
WEIGHT: 169 LBS | BODY MASS INDEX: 25.03 KG/M2 | HEIGHT: 69 IN | DIASTOLIC BLOOD PRESSURE: 86 MMHG | HEART RATE: 94 BPM | SYSTOLIC BLOOD PRESSURE: 138 MMHG | OXYGEN SATURATION: 97 % | TEMPERATURE: 98.2 F | RESPIRATION RATE: 14 BRPM

## 2019-07-03 DIAGNOSIS — R10.13 EPIGASTRIC PAIN: Primary | ICD-10-CM

## 2019-07-03 PROCEDURE — 99213 OFFICE O/P EST LOW 20 MIN: CPT | Performed by: NURSE PRACTITIONER

## 2019-07-03 ASSESSMENT — MIFFLIN-ST. JEOR: SCORE: 1589.92

## 2019-07-03 NOTE — PATIENT INSTRUCTIONS
Patient Education     Epigastric Pain (Uncertain Cause)  Epigastric pain is pain in the upper abdomen. It can be a sign of disease. Common causes include:    Acid reflux (stomach acid flowing up into the esophagus)    Gastritis (irritation of the stomach lining) Most often this is from aspirin or NSAID medicines such as ibuprofen, bacteria called H. pylori, or frequent alcohol use.    Peptic ulcer disease    Inflammation of the pancreas    Gallstone    Infection in the gallbladder  Pain may be dull or burning. It may spread upward to the chest or to the back. There may be other symptoms such as belching, bloating, cramps or hunger pains. There may be weight loss or poor appetite, nausea or vomiting.  Since the cause of your pain is not certain yet,you may need more tests. Sometimes the doctor will treat you for the most likely condition to see if there is improvement before doing more tests.  Home care  Medicines    Antacids help neutralize the normal acids in your stomach.If you don t like the liquid, you can try a chewable one. You may find one works better than another for you. Overuse can cause diarrhea or constipation.    Acid blockers (H2 blockers) decrease acid production. Examples are cimetidine, famotidine, and ranitidine.    Acid inhibitors (PPIs) decrease acid production in a different way than the blockers. You may find they work better, but can take a little longer to take effect.  Examples are omeprazole, lansoprazole, pantoprazole, rabeprazole, and esomeprazole. Many of these are available over-the-counter or available as generics.    Take an antacid 30 to 60 minutes after eating and at bedtime, but not at the same time as an acid blocker.    Try not to take NSAIDs such as ibuprofen. Aspirin may also cause problems, but if taking it for your heart or other medical reasons, talk to your doctor before stopping it; you don't want to cause a worse problem, like a heart attack or stroke.  Diet    If  certain foods seem to cause your pain, try not to eat them. Certain foods can worsen symptoms of gastritis. Limit or avoid fatty, fried, and spicy foods, as well as coffee, chocolate, mint, and foods with high acid content such as tomatoes and citrus fruit and juices (orange, grapefruit, lemon).    Eat slowly and chew food well before swallowing. Symptoms of gastritis can be worsened by certain foods.    Don't drink alcohol. It can irritate the stomach.    Don't consume caffeine, or use tobacco. These can delay healing and worsen your problem.    Try eating smaller meals with snacks in between.    Keep an empty stomach for 2 to 3 hours before lying down.    Prop the head of the bed up if you have overnight symptoms. This helps acid clear from your esophagus.  Follow-up care  Follow up with your healthcare provider or as advised.  When to seek medical advice  Call your healthcare provider right away if any of the following occur:    Stomach pain worsens or moves to the right lower part of the abdomen    Chest pain appears, or if it worsens or spreads to the chest, back, neck, shoulder, or arm    Frequent vomiting (can t keep down liquids)    Blood in the stool or vomit (red or black color)    Feeling weak or dizzy, fainting, or having trouble breathing    Fever of 100.4 F (38 C) or higher, or as directed by your healthcare provider    Abdominal swelling  Date Last Reviewed: 3/1/2018    3094-6913 [employer]. [address] [place] [address], [place] [address]. All rights reserved. This information is not intended as a substitute for professional medical care. Always follow your healthcare professional's instructions.

## 2019-07-03 NOTE — PROGRESS NOTES
Subjective     Kindra Gresham is a 24 year old female who presents to clinic today for the following health issues:    HPI   ED/UC Followup:    Facility:  [facility]  Date of visit: 7/1/19  Reason for visit: Abdominal pain  Current Status: Having upper abdominal pain     Vag us PCOS and does have history of PCOS  Pain moved around a little but mostly over belly button and LUQ, no diarrhea or constipation although does get a bit of indigestion after eating or when hungry, TUMs helped a little no history of GERD or ulcer. Does admit stress from trying to get pregnant. Minimal or no alcohol but does report pain was a bit better after she stopped taking Ibuprofen couple days jae. Pain worse in the evenings intermittent or waxes and wanes     Abd CT normal  Results for orders placed or performed during the hospital encounter of 07/01/19   CT Abdomen Pelvis w Contrast    Narrative    CT ABDOMEN AND PELVIS WITH CONTRAST   7/1/2019 3:52 PM     HISTORY: Bilateral lower abdominal pain and left upper quadrant pain.    TECHNIQUE: 80mL Isovue-370. Radiation dose for this scan was reduced  using automated exposure control, adjustment of the mA and/or kV  according to patient size, or iterative reconstruction technique.    COMPARISON: None.    FINDINGS: No evidence of diverticulitis or small bowel obstruction.  Unremarkable appendix. Bilateral extrarenal pelvis variant. Nothing  else acute is seen in the upper abdominal organs.       Impression    IMPRESSION:  No acute inflammatory process identified.     ABELARDO NICHOLS MD   US Pelvis Cmplt w Transvag & Doppler LmtPel Duplex Limited    Narrative    ULTRASOUND PELVIS COMPLETE WITH TRANSVAGINAL AND DOPPLER LIMITED   7/1/2019 5:04 PM    HISTORY:  Pelvic pain.    FINDINGS: Endovaginal sonography was added to the transabdominal  scans.    The uterus measured 5.8 x 3.7 x 4.7 cm with an endometrial thickness  of 0.3 cm. No myometrial abnormality was demonstrated. Numerous  small  peripheral follicles are noted bilaterally in the ovaries. Doppler  waveform analysis demonstrates normal blood flow to both ovaries.  There was trace free pelvic fluid.      Impression    IMPRESSION:  Bilateral ovarian numerous small peripheral follicles  raising the possibility of polycystic ovary syndrome. Otherwise  unremarkable exam.    ANTONIO JUARES MD   CBC with platelets differential   Result Value Ref Range    WBC 8.2 4.0 - 11.0 10e9/L    RBC Count 4.67 3.8 - 5.2 10e12/L    Hemoglobin 14.2 11.7 - 15.7 g/dL    Hematocrit 42.2 35.0 - 47.0 %    MCV 90 78 - 100 fl    MCH 30.4 26.5 - 33.0 pg    MCHC 33.6 31.5 - 36.5 g/dL    RDW 12.1 10.0 - 15.0 %    Platelet Count 327 150 - 450 10e9/L    Diff Method Automated Method     % Neutrophils 52.4 %    % Lymphocytes 38.9 %    % Monocytes 7.1 %    % Eosinophils 0.9 %    % Basophils 0.5 %    % Immature Granulocytes 0.2 %    Nucleated RBCs 0 0 /100    Absolute Neutrophil 4.3 1.6 - 8.3 10e9/L    Absolute Lymphocytes 3.2 0.8 - 5.3 10e9/L    Absolute Monocytes 0.6 0.0 - 1.3 10e9/L    Absolute Eosinophils 0.1 0.0 - 0.7 10e9/L    Absolute Basophils 0.0 0.0 - 0.2 10e9/L    Abs Immature Granulocytes 0.0 0 - 0.4 10e9/L    Absolute Nucleated RBC 0.0    Comprehensive metabolic panel   Result Value Ref Range    Sodium 140 133 - 144 mmol/L    Potassium 3.6 3.4 - 5.3 mmol/L    Chloride 106 94 - 109 mmol/L    Carbon Dioxide 26 20 - 32 mmol/L    Anion Gap 8 3 - 14 mmol/L    Glucose 91 70 - 99 mg/dL    Urea Nitrogen 16 7 - 30 mg/dL    Creatinine 0.81 0.52 - 1.04 mg/dL    GFR Estimate >90 >60 mL/min/[1.73_m2]    GFR Estimate If Black >90 >60 mL/min/[1.73_m2]    Calcium 9.4 8.5 - 10.1 mg/dL    Bilirubin Total 0.2 0.2 - 1.3 mg/dL    Albumin 4.1 3.4 - 5.0 g/dL    Protein Total 7.7 6.8 - 8.8 g/dL    Alkaline Phosphatase 50 40 - 150 U/L    ALT 25 0 - 50 U/L    AST 13 0 - 45 U/L   Lipase   Result Value Ref Range    Lipase 127 73 - 393 U/L   UA with Microscopic   Result Value Ref Range     Color Urine Straw     Appearance Urine Clear     Glucose Urine Negative NEG^Negative mg/dL    Bilirubin Urine Negative NEG^Negative    Ketones Urine Negative NEG^Negative mg/dL    Specific Gravity Urine 1.011 1.003 - 1.035    Blood Urine Negative NEG^Negative    pH Urine 6.5 5.0 - 7.0 pH    Protein Albumin Urine Negative NEG^Negative mg/dL    Urobilinogen mg/dL Normal 0.0 - 2.0 mg/dL    Nitrite Urine Negative NEG^Negative    Leukocyte Esterase Urine Negative NEG^Negative    Source Midstream Urine     WBC Urine <1 0 - 5 /HPF    RBC Urine 0 0 - 2 /HPF    Squamous Epithelial /HPF Urine 1 0 - 1 /HPF    Mucous Urine Present (A) NEG^Negative /LPF   EKG 12 lead   Result Value Ref Range    Interpretation ECG Click View Image link to view waveform and result    ISTAT HCG Quantitative Pregnancy POCT   Result Value Ref Range    HCG Quantitative Serum <5.0 <5.0 IU/L   Wet prep   Result Value Ref Range    Specimen Description Vagina     Wet Prep No Trichomonas seen     Wet Prep No yeast seen     Wet Prep Moderate  PMNs seen       Wet Prep No clue cells seen    Chlamydia trachomatis PCR   Result Value Ref Range    Specimen Description Vagina     Chlamydia Trachomatis PCR Negative NEG^Negative   Neisseria gonorrhoea PCR   Result Value Ref Range    Specimen Descrip Vagina     N Gonorrhea PCR Negative NEG^Negative      Menses ending, pain is higher up than usual for her, just took preg test and was negative - they were trying to conceive. No ill symptoms or exposures    Patient Active Problem List   Diagnosis     HSIL (high grade squamous intraepithelial lesion) on Pap smear of cervix     Pelvic pain in female     Factor V Leiden carrier (H)     Myalgia of pelvic floor     Past Surgical History:   Procedure Laterality Date     DAVINCI PELVIC PROCEDURE N/A 3/6/2019    Procedure: robotic assisted laparoscopic endometriosis resection/fulguration, hysterscopic septum removal, dilation and curettage, chromotubation;  Surgeon: Maral  Stephanie Bradshaw DO;  Location: [facility] OR     DILATION AND CURETTAGE, OPERATIVE HYSTEROSCOPY WITH MORCELLATOR, COMBINED N/A 3/6/2019    Procedure: COMBINED DILATION AND CURETTAGE, OPERATIVE HYSTEROSCOPY WITH MORCELLATOR;  Surgeon: Stephanie Alicia DO;  Location: [facility] OR     LEEP TX, CERVICAL  04/20/2018 04/20/18: LEEP CINDY 2 with LSIL also present, margins clear. (abstracted records)     ORTHOPEDIC SURGERY Right 2017    Arthroscopy Right Knee       Social History     Tobacco Use     Smoking status: Never Smoker     Smokeless tobacco: Never Used   Substance Use Topics     Alcohol use: Yes     Comment: Occasional     Family History   Problem Relation Age of Onset     Pulmonary Embolism Father         Dx-57     Myocardial Infarction Maternal Grandfather         Dx-68     Endometriosis Paternal Grandmother      Deep Vein Thrombosis Paternal Grandmother          Current Outpatient Medications   Medication Sig Dispense Refill     cetirizine (ZYRTEC) 10 MG tablet Take 10 mg by mouth daily       melatonin 5 MG CAPS Take 5 mg by mouth daily       Prenatal Vit-Fe Fumarate-FA (PRENATAL VITAMIN PO) Take 1 tablet by mouth       ibuprofen (ADVIL/MOTRIN) 800 MG tablet Take 1 tablet (800 mg) by mouth every 8 hours as needed for moderate pain (Patient not taking: Reported on 4/18/2019) 60 tablet 0     No Known Allergies  BP Readings from Last 3 Encounters:   07/03/19 138/86   07/01/19 144/88   05/22/19 (!) 130/96    Wt Readings from Last 3 Encounters:   07/03/19 76.7 kg (169 lb)   07/01/19 76.7 kg (169 lb)   05/22/19 76.2 kg (168 lb 1.6 oz)                      Reviewed and updated as needed this visit by Provider         Review of Systems   ROS COMP: Constitutional, HEENT, cardiovascular, pulmonary, GI, , musculoskeletal, neuro, skin, endocrine and psych systems are negative, except as otherwise noted.      Objective    /86 (BP Location: Right arm, Patient Position: Sitting, Cuff Size: Adult Regular)   Pulse 94   Temp 98.2  F  "(36.8  C) (Oral)   Resp 14   Ht 5' 9.25\" (1.759 m)   Wt 169 lb (76.7 kg)   LMP 06/28/2019 (Exact Date)   SpO2 97%   BMI 24.78 kg/m    Body mass index is 24.78 kg/m .  Physical Exam   GENERAL: healthy, alert and no distress  EYES: Eyes grossly normal to inspection, PERRL and conjunctivae and sclerae normal  NECK: no adenopathy, no asymmetry, masses, or scars and thyroid normal to palpation  RESP: lungs clear to auscultation - no rales, rhonchi or wheezes  CV: regular rate and rhythm, normal S1 S2, no S3 or S4, no murmur, click or rub, no peripheral edema and peripheral pulses strong  ABDOMEN: soft, tenderness epigastric, no organomegaly or masses and bowel sounds normal  MS: no gross musculoskeletal defects noted, no edema  SKIN: no suspicious lesions or rashes  NEURO: Normal strength and tone, mentation intact and speech normal  PSYCH: mentation appears normal, affect normal/bright slightly anxious     Diagnostic Test Results:  Labs reviewed in Epic  No results found for this or any previous visit (from the past 24 hour(s)).        Assessment & Plan       ICD-10-CM    1. Epigastric pain R10.13 H Pylori antigen, stool   likely differentials include GERD, ulcer, gas or indigestion, or med related, stop NSAID for now and plain tylenol in case this is the factor causing it. Instructed to monitor and if not improving will check for h pylori  Recommended TUMS, zantac, and/or simethicone prn Could trial food elimination diet if not resolving. Manage stress and good self care, last differential discussed is anxiety  see patient instructions        No follow-ups on file.    MARIA GUADALUPE [place] [facility]        "

## 2019-07-29 PROBLEM — M79.18 MYALGIA OF PELVIC FLOOR: Status: RESOLVED | Noted: 2019-06-11 | Resolved: 2019-06-11

## 2019-07-29 NOTE — PROGRESS NOTES
Kindra Gresham was seen 2 times for evaluation and treatment.  Patient did not return for further treatment and current status is unknown.  Due to short treatment duration, no objective or functional changes were made.  Please see goal flow sheet from episode noted date below and initial evaluation for further information.    Linked Episodes   Type: Episode: Status: Noted: Resolved: Last update: Updated by:   PHYSICAL THERAPY Pelvic Health Active 6/4/2019 6/11/2019  9:37 AM Tamika Koch, PT      Comments:

## 2020-02-06 ENCOUNTER — TELEPHONE (OUTPATIENT)
Dept: OBGYN | Facility: CLINIC | Age: 25
End: 2020-02-06

## 2020-02-06 NOTE — TELEPHONE ENCOUNTER
Pt is past due for f/u pap smear.  [facility] clinic and schedule.    Leigh Ann Huerta  Pap Tracking

## 2020-03-11 ENCOUNTER — HEALTH MAINTENANCE LETTER (OUTPATIENT)
Age: 25
End: 2020-03-11

## 2021-01-03 ENCOUNTER — HEALTH MAINTENANCE LETTER (OUTPATIENT)
Age: 26
End: 2021-01-03

## 2021-04-25 ENCOUNTER — HEALTH MAINTENANCE LETTER (OUTPATIENT)
Age: 26
End: 2021-04-25

## 2021-10-10 ENCOUNTER — HEALTH MAINTENANCE LETTER (OUTPATIENT)
Age: 26
End: 2021-10-10

## 2022-05-21 ENCOUNTER — HEALTH MAINTENANCE LETTER (OUTPATIENT)
Age: 27
End: 2022-05-21

## 2022-09-18 ENCOUNTER — HEALTH MAINTENANCE LETTER (OUTPATIENT)
Age: 27
End: 2022-09-18

## 2023-06-04 ENCOUNTER — HEALTH MAINTENANCE LETTER (OUTPATIENT)
Age: 28
End: 2023-06-04

## (undated) DEVICE — BASIN SET MINOR DISP

## (undated) DEVICE — DAVINCI OBTURATOR 8MM BLADELESS 420023

## (undated) DEVICE — BAG CLEAR TRASH 1.3M 39X33" P4040C

## (undated) DEVICE — PACK DAVINCI GYN SMA15GDFS1

## (undated) DEVICE — Device

## (undated) DEVICE — SOL NACL 0.9% IRRIG 3000ML BAG 2B7477

## (undated) DEVICE — SUCTION CANISTER BEMIS HI FLOW 006772-901

## (undated) DEVICE — TUBING CONMED AIRSEAL SMOKE EVAC INSUFFLATION ASM-EVAC

## (undated) DEVICE — SU VICRYL 0 TIE 12X18" J906G

## (undated) DEVICE — SUCTION CURETTE 3MM ENDOMETRIAL MX140

## (undated) DEVICE — SEAL SET MYOSURE ROD LENS SCOPE SINGLE USE 40-902

## (undated) DEVICE — PAD CHUX UNDERPAD 23X24" 7136

## (undated) DEVICE — DAVINCI SI DRAPE ACCESSORY KIT 3-ARM 420290

## (undated) DEVICE — TUBING SYS AQUILEX BLUE INFLOW AQL-110 YLW OUTFLOW AQL-111

## (undated) DEVICE — ESU CORD BIPOLAR GREEN 10-4000

## (undated) DEVICE — ESU GROUND PAD ADULT W/CORD E7507

## (undated) DEVICE — PACK MINOR LITHOTOMY RIDGES

## (undated) DEVICE — DAVINCI HOT SHEARS TIP COVER  400180

## (undated) DEVICE — DAVINCI S CANNULA SEAL 8.5-13MM 420206

## (undated) DEVICE — PAD CHUX UNDERPAD 30X36" P3036C

## (undated) DEVICE — SOL NACL 0.9% INJ 1000ML BAG 2B1324X

## (undated) DEVICE — LINEN FULL SHEET 5511

## (undated) DEVICE — DILATOR PROBE UTERINE OS CANAL FINDER 260-610

## (undated) DEVICE — LINEN HALF SHEET 5512

## (undated) DEVICE — DRSG TELFA 3X8" 1238

## (undated) DEVICE — SU DERMABOND ADVANCED .7ML DNX12

## (undated) DEVICE — ENDO TROCAR CONMED AIRSEAL BLADELESS 05X120MM IAS5-120LP

## (undated) DEVICE — LINEN ORTHO ACL PACK 5447

## (undated) DEVICE — PROTECTOR ARM ONE-STEP TRENDELENBURG 40418

## (undated) DEVICE — ESU CORD MONOPOLAR 10'  E0510

## (undated) DEVICE — SUCTION MANIFOLD NEPTUNE 2 SYS 4 PORT 0702-020-000

## (undated) DEVICE — LINEN DRAPE 54X72" 5467

## (undated) DEVICE — SYR 30ML LL W/O NDL 302832

## (undated) DEVICE — SPECIMEN BAG BEMIS HI FLOW SUCTION WHITE SOCK 533810

## (undated) DEVICE — SYR 50ML CATH TIP W/O NDL 309620

## (undated) DEVICE — CATH TRAY FOLEY SURESTEP 16FR DRAIN BAG STATOCK A899916

## (undated) DEVICE — KIT PATIENT POSITIONING PIGAZZI LATEX FREE 40580

## (undated) DEVICE — SOL NACL 0.9% IRRIG 1000ML BOTTLE 2F7124

## (undated) DEVICE — SUCTION IRR STRYKERFLOW II W/TIP 250-070-520

## (undated) DEVICE — GLOVE PROTEXIS BLUE W/NEU-THERA 6.5  2D73EB65

## (undated) DEVICE — SU MONOCRYL 4-0 PS-2 18" UND Y496G

## (undated) DEVICE — GLOVE PROTEXIS POWDER FREE SMT 6.0  2D72PT60X

## (undated) DEVICE — SYR 03ML LL W/O NDL 309657

## (undated) RX ORDER — BUPIVACAINE HYDROCHLORIDE 2.5 MG/ML
INJECTION, SOLUTION EPIDURAL; INFILTRATION; INTRACAUDAL
Status: DISPENSED
Start: 2019-03-06

## (undated) RX ORDER — GLYCOPYRROLATE 0.2 MG/ML
INJECTION INTRAMUSCULAR; INTRAVENOUS
Status: DISPENSED
Start: 2019-03-06

## (undated) RX ORDER — ONDANSETRON 2 MG/ML
INJECTION INTRAMUSCULAR; INTRAVENOUS
Status: DISPENSED
Start: 2019-03-06

## (undated) RX ORDER — BUPIVACAINE HYDROCHLORIDE AND EPINEPHRINE 5; 5 MG/ML; UG/ML
INJECTION, SOLUTION EPIDURAL; INTRACAUDAL; PERINEURAL
Status: DISPENSED
Start: 2019-03-06

## (undated) RX ORDER — NEOSTIGMINE METHYLSULFATE 1 MG/ML
VIAL (ML) INJECTION
Status: DISPENSED
Start: 2019-03-06

## (undated) RX ORDER — KETOROLAC TROMETHAMINE 30 MG/ML
INJECTION, SOLUTION INTRAMUSCULAR; INTRAVENOUS
Status: DISPENSED
Start: 2019-03-06

## (undated) RX ORDER — FENTANYL CITRATE 50 UG/ML
INJECTION, SOLUTION INTRAMUSCULAR; INTRAVENOUS
Status: DISPENSED
Start: 2019-03-06

## (undated) RX ORDER — KETAMINE HCL IN 0.9 % NACL 50 MG/5 ML
SYRINGE (ML) INTRAVENOUS
Status: DISPENSED
Start: 2019-03-06

## (undated) RX ORDER — PHENAZOPYRIDINE HYDROCHLORIDE 200 MG/1
TABLET, FILM COATED ORAL
Status: DISPENSED
Start: 2019-03-06

## (undated) RX ORDER — OXYCODONE HYDROCHLORIDE 5 MG/1
TABLET ORAL
Status: DISPENSED
Start: 2019-03-06

## (undated) RX ORDER — PROPOFOL 10 MG/ML
INJECTION, EMULSION INTRAVENOUS
Status: DISPENSED
Start: 2019-03-06

## (undated) RX ORDER — ACETAMINOPHEN 325 MG/1
TABLET ORAL
Status: DISPENSED
Start: 2019-03-06

## (undated) RX ORDER — CEFAZOLIN SODIUM 2 G/100ML
INJECTION, SOLUTION INTRAVENOUS
Status: DISPENSED
Start: 2019-03-06

## (undated) RX ORDER — LIDOCAINE HYDROCHLORIDE 10 MG/ML
INJECTION, SOLUTION EPIDURAL; INFILTRATION; INTRACAUDAL; PERINEURAL
Status: DISPENSED
Start: 2019-03-06

## (undated) RX ORDER — DEXAMETHASONE SODIUM PHOSPHATE 4 MG/ML
INJECTION, SOLUTION INTRA-ARTICULAR; INTRALESIONAL; INTRAMUSCULAR; INTRAVENOUS; SOFT TISSUE
Status: DISPENSED
Start: 2019-03-06